# Patient Record
Sex: FEMALE | Race: WHITE | HISPANIC OR LATINO | Employment: OTHER | URBAN - METROPOLITAN AREA
[De-identification: names, ages, dates, MRNs, and addresses within clinical notes are randomized per-mention and may not be internally consistent; named-entity substitution may affect disease eponyms.]

---

## 2017-02-13 ENCOUNTER — LAB (OUTPATIENT)
Dept: LAB | Facility: HOSPITAL | Age: 67
End: 2017-02-13

## 2017-02-13 ENCOUNTER — OFFICE VISIT (OUTPATIENT)
Dept: ONCOLOGY | Facility: CLINIC | Age: 67
End: 2017-02-13

## 2017-02-13 VITALS
WEIGHT: 108 LBS | DIASTOLIC BLOOD PRESSURE: 84 MMHG | TEMPERATURE: 98.3 F | HEART RATE: 70 BPM | BODY MASS INDEX: 17.99 KG/M2 | OXYGEN SATURATION: 98 % | RESPIRATION RATE: 14 BRPM | SYSTOLIC BLOOD PRESSURE: 134 MMHG | HEIGHT: 65 IN

## 2017-02-13 DIAGNOSIS — C91.10 CHRONIC LYMPHOID LEUKEMIA (HCC): Primary | ICD-10-CM

## 2017-02-13 DIAGNOSIS — C91.10 CHRONIC LYMPHOID LEUKEMIA (HCC): ICD-10-CM

## 2017-02-13 LAB
ALBUMIN SERPL-MCNC: 4.3 G/DL (ref 3.5–5.2)
ALBUMIN/GLOB SERPL: 1.6 G/DL (ref 1.1–2.4)
ALP SERPL-CCNC: 81 U/L (ref 38–116)
ALT SERPL W P-5'-P-CCNC: 24 U/L (ref 0–33)
ANION GAP SERPL CALCULATED.3IONS-SCNC: 13.7 MMOL/L
AST SERPL-CCNC: 29 U/L (ref 0–32)
BASOPHILS # BLD AUTO: 0.05 10*3/MM3 (ref 0–0.1)
BASOPHILS NFR BLD AUTO: 0.6 % (ref 0–1.1)
BILIRUB SERPL-MCNC: 0.3 MG/DL (ref 0.1–1.2)
BUN BLD-MCNC: 14 MG/DL (ref 6–20)
BUN/CREAT SERPL: 21.9 (ref 7.3–30)
CALCIUM SPEC-SCNC: 9.6 MG/DL (ref 8.5–10.2)
CHLORIDE SERPL-SCNC: 103 MMOL/L (ref 98–107)
CO2 SERPL-SCNC: 29.3 MMOL/L (ref 22–29)
CREAT BLD-MCNC: 0.64 MG/DL (ref 0.6–1.1)
DEPRECATED RDW RBC AUTO: 44.3 FL (ref 37–49)
EOSINOPHIL # BLD AUTO: 0.27 10*3/MM3 (ref 0–0.36)
EOSINOPHIL NFR BLD AUTO: 3.3 % (ref 1–5)
ERYTHROCYTE [DISTWIDTH] IN BLOOD BY AUTOMATED COUNT: 13.1 % (ref 11.7–14.5)
GFR SERPL CREATININE-BSD FRML MDRD: 93 ML/MIN/1.73
GLOBULIN UR ELPH-MCNC: 2.7 GM/DL (ref 1.8–3.5)
GLUCOSE BLD-MCNC: 79 MG/DL (ref 74–124)
HCT VFR BLD AUTO: 39.8 % (ref 34–45)
HGB BLD-MCNC: 13.4 G/DL (ref 11.5–14.9)
IMM GRANULOCYTES # BLD: 0.02 10*3/MM3 (ref 0–0.03)
IMM GRANULOCYTES NFR BLD: 0.2 % (ref 0–0.5)
LDH SERPL-CCNC: 220 U/L (ref 99–259)
LYMPHOCYTES # BLD AUTO: 2.43 10*3/MM3 (ref 1–3.5)
LYMPHOCYTES NFR BLD AUTO: 29.3 % (ref 20–49)
MCH RBC QN AUTO: 30.9 PG (ref 27–33)
MCHC RBC AUTO-ENTMCNC: 33.7 G/DL (ref 32–35)
MCV RBC AUTO: 91.7 FL (ref 83–97)
MONOCYTES # BLD AUTO: 0.52 10*3/MM3 (ref 0.25–0.8)
MONOCYTES NFR BLD AUTO: 6.3 % (ref 4–12)
NEUTROPHILS # BLD AUTO: 4.99 10*3/MM3 (ref 1.5–7)
NEUTROPHILS NFR BLD AUTO: 60.3 % (ref 39–75)
NRBC BLD MANUAL-RTO: 0 /100 WBC (ref 0–0)
PLATELET # BLD AUTO: 224 10*3/MM3 (ref 150–375)
PMV BLD AUTO: 10.2 FL (ref 8.9–12.1)
POTASSIUM BLD-SCNC: 4 MMOL/L (ref 3.5–4.7)
PROT SERPL-MCNC: 7 G/DL (ref 6.3–8)
RBC # BLD AUTO: 4.34 10*6/MM3 (ref 3.9–5)
SODIUM BLD-SCNC: 146 MMOL/L (ref 134–145)
WBC NRBC COR # BLD: 8.28 10*3/MM3 (ref 4–10)

## 2017-02-13 PROCEDURE — 83615 LACTATE (LD) (LDH) ENZYME: CPT | Performed by: INTERNAL MEDICINE

## 2017-02-13 PROCEDURE — 36415 COLL VENOUS BLD VENIPUNCTURE: CPT | Performed by: INTERNAL MEDICINE

## 2017-02-13 PROCEDURE — 99213 OFFICE O/P EST LOW 20 MIN: CPT | Performed by: INTERNAL MEDICINE

## 2017-02-13 PROCEDURE — 85025 COMPLETE CBC W/AUTO DIFF WBC: CPT | Performed by: INTERNAL MEDICINE

## 2017-02-13 PROCEDURE — 80053 COMPREHEN METABOLIC PANEL: CPT | Performed by: INTERNAL MEDICINE

## 2017-02-13 NOTE — PROGRESS NOTES
Subjective  :  Chronic lymphoid leukemia     The patient has completed 6 cycles of Treanda/Rituxan and on 05/03/2016 we advised the patient to proceed with further assessment including bone marrow aspirate and biopsy on the left pelvic bone. The patient also had a CT scan of the chest, abdomen and pelvis that showed resolution of all her areas of disease, especially retroperitoneal adenopathy. Her white count has normalized, white count differential is normal, hemoglobin is normal, platelet count is normal. She has no palpable adenopathies, no B symptoms, no infections and no autoimmunity. The patient will remain in observation returning to see us in a month to discuss analysis of her bone marrow testing. Plan to continue her prophylactic antibiotic, antifungal and antiviral for at least another 4-5 months. Plan to repeat her LDH that has been minimally elevated during previous visits on the next visit.  During the previous visit the patient requested 3rd opinion at [facility]. . The patient states that her physician told her that she looks like she had achieved a very good remission in regard to her CLL and she was advised at the time of relapse to go into a clinical trial.              History of Present Illness   So far the patient is not having any fevers, chills, adenopathy, rashes in the skin or fatigue. She has not developed any pain related to malignancy. Her weight and appetite remain normal. Her bowel activity and urination remain normal. She has no cardiorespiratory symptomatology at this time. She remains fully functional, ECOG performance status 0.    Past Medical History, Past Surgical History:.  :  Scoliosis; this gives her back pain.      No history of hypertension, coronary artery disease, cardiac arrhythmia, chronic obstructive pulmonary disease, asthma, emphysema, pleurisy, or gastrointestinal disorders of any nature.  She has no history of peptic ulcer disease.  The patient has no  history of hepatitis, pancreatitis, or colitis.  She has never had a colonoscopy.      She has no history of neurological disorder, no history of psychiatric illnesses.     SURGICAL HISTORY:    1. Replacement of the left shoulder in  in [place], [place] because of osteoarthritis, trauma, and pain.    2. Left ovary removed and part of the right ovary because of cyst formations; this was done in  in [place], [place].  No malignancy was documented.    3. Left shoulder revision in .   4. Nasal septum correction years ago.     GYN HISTORY:  Her pelvic exams have been normal.  Mammograms and self-breast examinations have been normal.  She has never had abnormal Pap smears.  She is a  0, para 0.      Review of Systems   General: no fever, chills, fatigue, weight changes, or lack of appetite.  Eyes: no epiphora, xerophthalmia,conjunctivitis, pain, glaucoma, blurred vision, blindness, secretion, photophobia, proptosis, diplopia.  Ears: no otorrhea, tinnitus, otorrhagia, deafness, pain, vertigo.  Nose: no rhinorrhea, epistaxis, alteration in perception of odors, sinuses pressure.  Mouth: no alteration in gums or teeth,  ulcers, no difficulty with mastication or deglut ion, no odynophagia.  Neck: no masses or pain, no thyroid alterations, no pain in muscles or arteries, no carotid odynia, no crepitation.  Respiratory: no cough, sputum production, dyspnea, trepopnea, pleuritic pain, hemoptysis.  Heart: no syncope, irregularity, palpitations, angina, orthopnea, paroxysmal nocturnal dyspnea.  Vascular Venous: no tenderness,edema, palpable cords, postphlebitic syndrome, skin changes or ulcerations.  Vascular Arterial: no distal ischemia, claudication, gangrene, neuropathic ischemic pain, skin ulcers, paleness or cyanosis.  GI: no dysphagia, odynophagia, no regurgitation, no heartburn,no indigestion,no nausea,no vomiting,no hematemesis ,no melena,no jaundice,no distention, no obstipation,no enterorrhagia,no  "proctalgia,no anal  lesions, nochanges in bowel habits.  : no frequency, hesitancy, hematuria, discharge, pain.  Musculoskeletal: no muscle or tendon pain or inflammation, joint pain, edema, functional limitation, fasciculations, mass.  Neurologic: no headache, seizures, alterations on Craneal nerves, no motor or senssory deficit, normal coordination, no alteration in memory, orientation, calculation,writting, verbal or written language.  Skin: no rashes, pruritus or localized lesions.  Psychiatric: no anxiety, depression, agitation, delusions, proper insight.  Medications:  The current medication list was reviewed in the EMR    ALLERGIES:    Allergies   Allergen Reactions   • Asendin [Amoxapine]    • Diphenhydramine Hcl Other (See Comments)     Redness,  Pt prefers not to take       Objective      Vitals:    02/13/17 0906   BP: 134/84   Pulse: 70   Resp: 14   Temp: 98.3 °F (36.8 °C)   TempSrc: Oral   SpO2: 98%   Weight: 108 lb (49 kg)   Height: 64.57\" (164 cm)   PainSc: 0-No pain     Current Status 2/13/2017   ECOG score 0   no pain    Physical Exam    GENERAL:  Well-developed, well-nourished  Patient  in no acute distress.   SKIN:  Warm, dry without rashes, purpura or petechiae.  HEENT:  Pupils were equal and reactive to light and accomodation, conjunctivas non injected, no pterigion, normal extraocular movements, normal visual acuity.   Mouth mucosa was moist, no exudates in oropharynx, normal gum line, normal roof of the mouth and pillars, normal papillations of the tongue.Ear canals were normal, as well tympanic membranes, normal hearing acuity.No pain in mastoid area or erythema.  NECK:  Supple with good range of motion; no thyromegaly or masses, no JVD or bruits, no cervical adenopathies.No carotid arteries pain, no carotid abnormal pulsation or arterial dance.  LYMPHATICS:  No cervical, supraclavicular, axillary, epitrochlear or inguinal adenopathy.  CHEST:  Normal excursion of both qian thoraces, normal " voice fremitus, no subcutaneous emphysema, normal axillas, no rashes or acanthosis nigricans. Lungs clear to percussion and auscultation, normal breath sounds bilaterally, no wheezing, crackles or ronchi, no stridor, no rubs.  CARDIAC AND VASCULAR: PMI not displaced,no thrills, normal rate and regular rhythm, without murmurs, rubs or S3 or S4 right or left sided gallops. Normal femoral, popliteal, pedis, brachial and carotid pulses.  ABDOMEN:  Soft, nontender with no organomegaly or masses, no ascites, no collateral circulation,no distention,no Sd sign, no abdominal pain, no inguinal hernias,no umbilical hernias, no abdominal bruits. Normal bowel sounds.  GENITAL: Not  Performed.  EXTREMITIES  AND SPINE:  No clubbing, cyanosis or edema, no deformities or pain .no changes in her  kyphosis, andscoliosis,or pain in spine.  NEUROLOGICAL:  Patient was awake, alert, oriented to time, person and place,normal gait and coordination, negative Romberg.  GRECENT LABS:  Hematology WBC   Date Value Ref Range Status   02/13/2017 8.28 4.00 - 10.00 10*3/mm3 Final     RBC   Date Value Ref Range Status   02/13/2017 4.34 3.90 - 5.00 10*6/mm3 Final     HEMOGLOBIN   Date Value Ref Range Status   02/13/2017 13.4 11.5 - 14.9 g/dL Final     HEMATOCRIT   Date Value Ref Range Status   02/13/2017 39.8 34.0 - 45.0 % Final     PLATELETS   Date Value Ref Range Status   02/13/2017 224 150 - 375 10*3/mm3 Final      Differential   Order: [id number] - Part of Panel Order [id number]   Status:  Final result   Visible to patient:  No (Not Released) Dx:  Chronic lymphoid leukemia      Ref Range & Units 8:48 AM     WBC 4.00 - 10.00 10*3/mm3 8.28   RBC 3.90 - 5.00 10*6/mm3 4.34   Hemoglobin 11.5 - 14.9 g/dL 13.4   Hematocrit 34.0 - 45.0 % 39.8   MCV 83.0 - 97.0 fL 91.7   MCH 27.0 - 33.0 pg 30.9   MCHC 32.0 - 35.0 g/dL 33.7   RDW 11.7 - 14.5 % 13.1   RDW-SD 37.0 - 49.0 fl 44.3   MPV 8.9 - 12.1 fL 10.2   Platelets 150 - 375 10*3/mm3 224   Neutrophil % 39.0 -  75.0 % 60.3   Lymphocyte % 20.0 - 49.0 % 29.3   Monocyte % 4.0 - 12.0 % 6.3   Eosinophil % 1.0 - 5.0 % 3.3   Basophil % 0.0 - 1.1 % 0.6   Immature Grans % 0.0 - 0.5 % 0.2   Neutrophils, Absolute 1.50 - 7.00 10*3/mm3 4.99   Lymphocytes, Absolute 1.00 - 3.50 10*3/mm3 2.43   Monocytes, Absolute 0.25 - 0.80 10*3/mm3 0.52   Eosinophils, Absolute 0.00 - 0.36 10*3/mm3 0.27   Basophils, Absolute 0.00 - 0.10 10*3/mm3 0.05   Immature Grans, Absolute 0.00 - 0.03 10*3/mm3 0.02   nRBC 0.0 - 0.0 /100 WBC 0.0   Resulting Agency   [facility]      Specimen Collected: 02/13/17  8:48 AM Last Resulted: 02/13/17  9:04 AM                  Assessment/Plan     Chronic lymphoid leukemia     This patient has completed 6 cycles of Treanda/Rituxan with excellent tolerance. She has not developed any infections or autoimmunity. Her physical examination today is normal with resolution of her palpable cervical and axillary adenopathies. She has no B symptoms. The rest of her examination remains unchanged. Her recent laboratory assessment including blood count today shows normalization of her lymphocyte count, stable hemoglobin and stable normal platelet count     I encouraged her to stop her acyclovir for prevention of viral infections .    Otherwise we will review her back in 2 months with a CBC, chemistry profile and LDH.    No need for radiologic testing under the present clinical circumstances, neither to go to [facility].              2/13/2017      CC:

## 2017-06-01 ENCOUNTER — LAB (OUTPATIENT)
Dept: LAB | Facility: HOSPITAL | Age: 67
End: 2017-06-01

## 2017-06-01 ENCOUNTER — TELEPHONE (OUTPATIENT)
Dept: ONCOLOGY | Facility: CLINIC | Age: 67
End: 2017-06-01

## 2017-06-01 ENCOUNTER — OFFICE VISIT (OUTPATIENT)
Dept: ONCOLOGY | Facility: CLINIC | Age: 67
End: 2017-06-01

## 2017-06-01 VITALS
SYSTOLIC BLOOD PRESSURE: 120 MMHG | TEMPERATURE: 98.1 F | RESPIRATION RATE: 14 BRPM | BODY MASS INDEX: 17.66 KG/M2 | HEIGHT: 65 IN | HEART RATE: 68 BPM | WEIGHT: 106 LBS | DIASTOLIC BLOOD PRESSURE: 70 MMHG

## 2017-06-01 DIAGNOSIS — C91.10 CHRONIC LYMPHOID LEUKEMIA (HCC): Primary | ICD-10-CM

## 2017-06-01 LAB
ALBUMIN SERPL-MCNC: 4.2 G/DL (ref 3.5–5.2)
ALBUMIN/GLOB SERPL: 1.4 G/DL (ref 1.1–2.4)
ALP SERPL-CCNC: 80 U/L (ref 38–116)
ALT SERPL W P-5'-P-CCNC: 20 U/L (ref 0–33)
ANION GAP SERPL CALCULATED.3IONS-SCNC: 14.5 MMOL/L
AST SERPL-CCNC: 28 U/L (ref 0–32)
BASOPHILS # BLD AUTO: 0.07 10*3/MM3 (ref 0–0.1)
BASOPHILS NFR BLD AUTO: 0.8 % (ref 0–1.1)
BILIRUB SERPL-MCNC: 0.3 MG/DL (ref 0.1–1.2)
BUN BLD-MCNC: 11 MG/DL (ref 6–20)
BUN/CREAT SERPL: 18.6 (ref 7.3–30)
CALCIUM SPEC-SCNC: 9 MG/DL (ref 8.5–10.2)
CHLORIDE SERPL-SCNC: 101 MMOL/L (ref 98–107)
CO2 SERPL-SCNC: 26.5 MMOL/L (ref 22–29)
CREAT BLD-MCNC: 0.59 MG/DL (ref 0.6–1.1)
DEPRECATED RDW RBC AUTO: 46.1 FL (ref 37–49)
EOSINOPHIL # BLD AUTO: 0.36 10*3/MM3 (ref 0–0.36)
EOSINOPHIL NFR BLD AUTO: 4 % (ref 1–5)
ERYTHROCYTE [DISTWIDTH] IN BLOOD BY AUTOMATED COUNT: 13.7 % (ref 11.7–14.5)
GFR SERPL CREATININE-BSD FRML MDRD: 102 ML/MIN/1.73
GLOBULIN UR ELPH-MCNC: 3.1 GM/DL (ref 1.8–3.5)
GLUCOSE BLD-MCNC: 72 MG/DL (ref 74–124)
HCT VFR BLD AUTO: 42.6 % (ref 34–45)
HGB BLD-MCNC: 13.9 G/DL (ref 11.5–14.9)
IMM GRANULOCYTES # BLD: 0.03 10*3/MM3 (ref 0–0.03)
IMM GRANULOCYTES NFR BLD: 0.3 % (ref 0–0.5)
LDH SERPL-CCNC: 221 U/L (ref 99–259)
LYMPHOCYTES # BLD AUTO: 3.36 10*3/MM3 (ref 1–3.5)
LYMPHOCYTES NFR BLD AUTO: 37.5 % (ref 20–49)
MCH RBC QN AUTO: 30.4 PG (ref 27–33)
MCHC RBC AUTO-ENTMCNC: 32.6 G/DL (ref 32–35)
MCV RBC AUTO: 93.2 FL (ref 83–97)
MONOCYTES # BLD AUTO: 1.02 10*3/MM3 (ref 0.25–0.8)
MONOCYTES NFR BLD AUTO: 11.4 % (ref 4–12)
NEUTROPHILS # BLD AUTO: 4.13 10*3/MM3 (ref 1.5–7)
NEUTROPHILS NFR BLD AUTO: 46 % (ref 39–75)
NRBC BLD MANUAL-RTO: 0 /100 WBC (ref 0–0)
PLATELET # BLD AUTO: 248 10*3/MM3 (ref 150–375)
PMV BLD AUTO: 9.9 FL (ref 8.9–12.1)
POTASSIUM BLD-SCNC: 3.5 MMOL/L (ref 3.5–4.7)
PROT SERPL-MCNC: 7.3 G/DL (ref 6.3–8)
RBC # BLD AUTO: 4.57 10*6/MM3 (ref 3.9–5)
SODIUM BLD-SCNC: 142 MMOL/L (ref 134–145)
WBC NRBC COR # BLD: 8.97 10*3/MM3 (ref 4–10)

## 2017-06-01 PROCEDURE — 36415 COLL VENOUS BLD VENIPUNCTURE: CPT

## 2017-06-01 PROCEDURE — 99213 OFFICE O/P EST LOW 20 MIN: CPT | Performed by: INTERNAL MEDICINE

## 2017-06-01 PROCEDURE — 80053 COMPREHEN METABOLIC PANEL: CPT

## 2017-06-01 PROCEDURE — 83615 LACTATE (LD) (LDH) ENZYME: CPT

## 2017-06-01 PROCEDURE — 85025 COMPLETE CBC W/AUTO DIFF WBC: CPT

## 2017-06-01 NOTE — PROGRESS NOTES
Subjective  :  Chronic lymphoid leukemia     The patient has completed 6 cycles of Treanda/Rituxan and on 05/03/2016 we advised the patient to proceed with further assessment including bone marrow aspirate and biopsy on the left pelvic bone. The patient also had a CT scan of the chest, abdomen and pelvis that showed resolution of all her areas of disease, especially retroperitoneal adenopathy. Her white count has normalized, white count differential is normal, hemoglobin is normal, platelet count is normal. She has no palpable adenopathies, no B symptoms, no infections and no autoimmunity. The patient will remain in observation returning to see us in a month to discuss analysis of her bone marrow testing.  During the previous visit the patient requested 3rd opinion at [facility]. . The patient states that her physician told her that she looks like she had achieved a very good remission in regard to her CLL and she was advised at the time of relapse to go into a clinical trial.              History of Present Illness   So far the patient is not having any fevers, chills, adenopathy, rashes in the skin or fatigue. She has not developed any pain related to malignancy. Her weight and appetite remain normal. Her bowel activity and urination remain normal. She has no cardiorespiratory symptomatology at this time. She remains fully functional, ECOG performance status 0.    Past Medical History, Past Surgical History:.  :  Scoliosis; this gives her back pain.      No history of hypertension, coronary artery disease, cardiac arrhythmia, chronic obstructive pulmonary disease, asthma, emphysema, pleurisy, or gastrointestinal disorders of any nature.  She has no history of peptic ulcer disease.  The patient has no history of hepatitis, pancreatitis, or colitis.  She has never had a colonoscopy.      She has no history of neurological disorder, no history of psychiatric illnesses.     SURGICAL HISTORY:     1. Replacement of the left shoulder in  in [place], [place] because of osteoarthritis, trauma, and pain.    2. Left ovary removed and part of the right ovary because of cyst formations; this was done in  in [place], [place].  No malignancy was documented.    3. Left shoulder revision in .   4. Nasal septum correction years ago.     GYN HISTORY:  Her pelvic exams have been normal.  Mammograms and self-breast examinations have been normal.  She has never had abnormal Pap smears.  She is a  0, para 0.      Review of Systems   General: no fever, chills, fatigue, weight changes, or lack of appetite.  Eyes: no epiphora, xerophthalmia,conjunctivitis, pain, glaucoma, blurred vision, blindness, secretion, photophobia, proptosis, diplopia.  Ears: no otorrhea, tinnitus, otorrhagia, deafness, pain, vertigo.  Nose: no rhinorrhea, epistaxis, alteration in perception of odors, sinuses pressure.  Mouth: no alteration in gums or teeth,  ulcers, no difficulty with mastication or deglut ion, no odynophagia.  Neck: no masses or pain, no thyroid alterations, no pain in muscles or arteries, no carotid odynia, no crepitation.  Respiratory: no cough, sputum production, dyspnea, trepopnea, pleuritic pain, hemoptysis.  Heart: no syncope, irregularity, palpitations, angina, orthopnea, paroxysmal nocturnal dyspnea.  Vascular Venous: no tenderness,edema, palpable cords, postphlebitic syndrome, skin changes or ulcerations.  Vascular Arterial: no distal ischemia, claudication, gangrene, neuropathic ischemic pain, skin ulcers, paleness or cyanosis.  GI: no dysphagia, odynophagia, no regurgitation, no heartburn,no indigestion,no nausea,no vomiting,no hematemesis ,no melena,no jaundice,no distention, no obstipation,no enterorrhagia,no proctalgia,no anal  lesions, nochanges in bowel habits.  : no frequency, hesitancy, hematuria, discharge, pain.  Musculoskeletal: no muscle or tendon pain or inflammation, joint pain,  "edema, functional limitation, fasciculations, mass.  Neurologic: no headache, seizures, alterations on Craneal nerves, no motor or senssory deficit, normal coordination, no alteration in memory, orientation, calculation,writting, verbal or written language.  Skin: no rashes, pruritus or localized lesions.  Psychiatric: no anxiety, depression, agitation, delusions, proper insight.  Medications:  The current medication list was reviewed in the EMR    ALLERGIES:    Allergies   Allergen Reactions   • Asendin [Amoxapine]    • Diphenhydramine Hcl Other (See Comments)     Redness,  Pt prefers not to take       Objective      Vitals:    06/01/17 0949   Weight: 106 lb (48.1 kg)   Height: 64.57\" (164 cm)   PainSc: 0-No pain     Current Status 6/1/2017   ECOG score 0   no pain    Physical Exam    GENERAL:  Well-developed, well-nourished  Patient  in no acute distress.   SKIN:  Warm, dry without rashes, purpura or petechiae.  HEENT:  Pupils were equal and reactive to light and accomodation, conjunctivas non injected, no pterigion, normal extraocular movements, normal visual acuity.   Mouth mucosa was moist, no exudates in oropharynx, normal gum line, normal roof of the mouth and pillars, normal papillations of the tongue.Ear canals were normal, as well tympanic membranes, normal hearing acuity.No pain in mastoid area or erythema.  NECK:  Supple with good range of motion; no thyromegaly or masses, no JVD or bruits, no cervical adenopathies.No carotid arteries pain, no carotid abnormal pulsation or arterial dance.  LYMPHATICS:  No cervical, supraclavicular, axillary, epitrochlear or inguinal adenopathy.  CHEST:  Normal excursion of both qian thoraces, normal voice fremitus, no subcutaneous emphysema, normal axillas, no rashes or acanthosis nigricans. Lungs clear to percussion and auscultation, normal breath sounds bilaterally, no wheezing, crackles or ronchi, no stridor, no rubs.  CARDIAC AND VASCULAR: PMI not displaced,no " thrills, normal rate and regular rhythm, without murmurs, rubs or S3 or S4 right or left sided gallops. Normal femoral, popliteal, pedis, brachial and carotid pulses.  ABDOMEN:  Soft, nontender with no organomegaly or masses, no ascites, no collateral circulation,no distention,no Sd sign, no abdominal pain, no inguinal hernias,no umbilical hernias, no abdominal bruits. Normal bowel sounds.  GENITAL: Not  Performed.  EXTREMITIES  AND SPINE:  No clubbing, cyanosis or edema, no deformities or pain .no changes in her  kyphosis, andscoliosis,or pain in spine.  NEUROLOGICAL:  Patient was awake, alert, oriented to time, person and place,normal gait and coordination, negative Romberg.  GRECENT LABS:  Hematology WBC   Date Value Ref Range Status   06/01/2017 8.97 4.00 - 10.00 10*3/mm3 Final     RBC   Date Value Ref Range Status   06/01/2017 4.57 3.90 - 5.00 10*6/mm3 Final     Hemoglobin   Date Value Ref Range Status   06/01/2017 13.9 11.5 - 14.9 g/dL Final     Hematocrit   Date Value Ref Range Status   06/01/2017 42.6 34.0 - 45.0 % Final     Platelets   Date Value Ref Range Status   06/01/2017 248 150 - 375 10*3/mm3 Final        Component      Latest Ref Rng & Units 2/13/2017 6/1/2017   WBC      4.00 - 10.00 10*3/mm3 8.28 8.97   RBC      3.90 - 5.00 10*6/mm3 4.34 4.57   Hemoglobin      11.5 - 14.9 g/dL 13.4 13.9   Hematocrit      34.0 - 45.0 % 39.8 42.6   MCV      83.0 - 97.0 fL 91.7 93.2   MCH      27.0 - 33.0 pg 30.9 30.4   MCHC      32.0 - 35.0 g/dL 33.7 32.6   RDW      11.7 - 14.5 % 13.1 13.7   RDW-SD      37.0 - 49.0 fl 44.3 46.1   MPV      8.9 - 12.1 fL 10.2 9.9   Platelets      150 - 375 10*3/mm3 224 248   Neutrophil %      39.0 - 75.0 % 60.3 46.0   Lymphocyte %      20.0 - 49.0 % 29.3 37.5   Monocyte %      4.0 - 12.0 % 6.3 11.4   Eosinophil %      1.0 - 5.0 % 3.3 4.0   Basophil %      0.0 - 1.1 % 0.6 0.8   Immature Grans %      0.0 - 0.5 % 0.2 0.3   Neutrophils, Absolute      1.50 - 7.00 10*3/mm3 4.99 4.13    Lymphocytes, Absolute      1.00 - 3.50 10*3/mm3 2.43 3.36   Monocytes, Absolute      0.25 - 0.80 10*3/mm3 0.52 1.02 (H)   Eosinophils, Absolute      0.00 - 0.36 10*3/mm3 0.27 0.36   Basophils, Absolute      0.00 - 0.10 10*3/mm3 0.05 0.07   Immature Grans, Absolute      0.00 - 0.03 10*3/mm3 0.02 0.03   nRBC      0.0 - 0.0 /100 WBC 0.0 0.0           Assessment/Plan     Chronic lymphoid leukemia     This patient has completed 6 cycles of Treanda/Rituxan with excellent tolerance. She has not developed any infections or autoimmunity. Her physical examination today is normal with resolution of her palpable cervical and axillary adenopathies. She has no B symptoms. The rest of her examination remains unchanged.    Today we discussed with the patient and  a minor rise in the lymphocytes with a total lymphocyte count of 3300 still within normal limits but telling us that eventually her leukemia is going to come back. She already has an appointment to be seen at [facility] next week. She wants to participate in a clinical trial at that institution and that will be perfectly fine with me. I have made a tentative appointment for her to return back and see us in a couple of months and I asked her to give us a call after she is seen by the physicians at [facility].    She will be called at home with the report of her LDH and the rest of the chemistry profile not available yet.    She agrees to proceed with this.                 6/1/2017      CC:

## 2017-06-07 ENCOUNTER — TELEPHONE (OUTPATIENT)
Dept: ONCOLOGY | Facility: CLINIC | Age: 67
End: 2017-06-07

## 2017-06-07 NOTE — TELEPHONE ENCOUNTER
Patient called to let Dr Minaya know she is back from [facility]. She said he wanted to talk to her about her appt with them. Call given to Dr Minaya.

## 2017-06-07 NOTE — TELEPHONE ENCOUNTER
Pt seen at [facility] center, recommended observation and monitoring by us, few prolymphocytes in peripheral blood mild elevation ldh, she aggred with that, keep cb here the same

## 2017-07-31 ENCOUNTER — OFFICE VISIT (OUTPATIENT)
Dept: ONCOLOGY | Facility: CLINIC | Age: 67
End: 2017-07-31

## 2017-07-31 ENCOUNTER — LAB (OUTPATIENT)
Dept: LAB | Facility: HOSPITAL | Age: 67
End: 2017-07-31

## 2017-07-31 VITALS
DIASTOLIC BLOOD PRESSURE: 70 MMHG | RESPIRATION RATE: 14 BRPM | BODY MASS INDEX: 17.49 KG/M2 | WEIGHT: 105 LBS | OXYGEN SATURATION: 100 % | HEIGHT: 65 IN | TEMPERATURE: 97.7 F | HEART RATE: 58 BPM | SYSTOLIC BLOOD PRESSURE: 122 MMHG

## 2017-07-31 DIAGNOSIS — C91.10 CHRONIC LYMPHOID LEUKEMIA (HCC): Primary | ICD-10-CM

## 2017-07-31 DIAGNOSIS — C91.10 CHRONIC LYMPHOID LEUKEMIA (HCC): ICD-10-CM

## 2017-07-31 LAB
ALBUMIN SERPL-MCNC: 4 G/DL (ref 3.5–5.2)
ALBUMIN/GLOB SERPL: 1.3 G/DL (ref 1.1–2.4)
ALP SERPL-CCNC: 74 U/L (ref 38–116)
ALT SERPL W P-5'-P-CCNC: 21 U/L (ref 0–33)
ANION GAP SERPL CALCULATED.3IONS-SCNC: 13.9 MMOL/L
AST SERPL-CCNC: 29 U/L (ref 0–32)
B2 MICROGLOB SERPL-MCNC: 2.5 MG/L (ref 0.8–2.2)
BASOPHILS # BLD AUTO: 0.09 10*3/MM3 (ref 0–0.1)
BASOPHILS NFR BLD AUTO: 0.8 % (ref 0–1.1)
BILIRUB SERPL-MCNC: 0.3 MG/DL (ref 0.1–1.2)
BUN BLD-MCNC: 18 MG/DL (ref 6–20)
BUN/CREAT SERPL: 28.6 (ref 7.3–30)
CALCIUM SPEC-SCNC: 9 MG/DL (ref 8.5–10.2)
CHLORIDE SERPL-SCNC: 102 MMOL/L (ref 98–107)
CO2 SERPL-SCNC: 25.1 MMOL/L (ref 22–29)
CREAT BLD-MCNC: 0.63 MG/DL (ref 0.6–1.1)
DEPRECATED RDW RBC AUTO: 45.6 FL (ref 37–49)
EOSINOPHIL # BLD AUTO: 0.3 10*3/MM3 (ref 0–0.36)
EOSINOPHIL NFR BLD AUTO: 2.6 % (ref 1–5)
ERYTHROCYTE [DISTWIDTH] IN BLOOD BY AUTOMATED COUNT: 13.2 % (ref 11.7–14.5)
GFR SERPL CREATININE-BSD FRML MDRD: 94 ML/MIN/1.73
GLOBULIN UR ELPH-MCNC: 3.2 GM/DL (ref 1.8–3.5)
GLUCOSE BLD-MCNC: 77 MG/DL (ref 74–124)
HCT VFR BLD AUTO: 42.7 % (ref 34–45)
HGB BLD-MCNC: 14.2 G/DL (ref 11.5–14.9)
HOLD SPECIMEN: NORMAL
IMM GRANULOCYTES # BLD: 0.03 10*3/MM3 (ref 0–0.03)
IMM GRANULOCYTES NFR BLD: 0.3 % (ref 0–0.5)
LDH SERPL-CCNC: 250 U/L (ref 99–259)
LYMPHOCYTES # BLD AUTO: 6.06 10*3/MM3 (ref 1–3.5)
LYMPHOCYTES NFR BLD AUTO: 53.2 % (ref 20–49)
MCH RBC QN AUTO: 31.1 PG (ref 27–33)
MCHC RBC AUTO-ENTMCNC: 33.3 G/DL (ref 32–35)
MCV RBC AUTO: 93.6 FL (ref 83–97)
MONOCYTES # BLD AUTO: 0.67 10*3/MM3 (ref 0.25–0.8)
MONOCYTES NFR BLD AUTO: 5.9 % (ref 4–12)
NEUTROPHILS # BLD AUTO: 4.24 10*3/MM3 (ref 1.5–7)
NEUTROPHILS NFR BLD AUTO: 37.2 % (ref 39–75)
NRBC BLD MANUAL-RTO: 0 /100 WBC (ref 0–0)
PLATELET # BLD AUTO: 187 10*3/MM3 (ref 150–375)
PMV BLD AUTO: 10.8 FL (ref 8.9–12.1)
POTASSIUM BLD-SCNC: 3.6 MMOL/L (ref 3.5–4.7)
PROT SERPL-MCNC: 7.2 G/DL (ref 6.3–8)
RBC # BLD AUTO: 4.56 10*6/MM3 (ref 3.9–5)
SODIUM BLD-SCNC: 141 MMOL/L (ref 134–145)
WBC NRBC COR # BLD: 11.39 10*3/MM3 (ref 4–10)

## 2017-07-31 PROCEDURE — 83615 LACTATE (LD) (LDH) ENZYME: CPT | Performed by: INTERNAL MEDICINE

## 2017-07-31 PROCEDURE — 88275 CYTOGENETICS 100-300: CPT | Performed by: INTERNAL MEDICINE

## 2017-07-31 PROCEDURE — 82232 ASSAY OF BETA-2 PROTEIN: CPT | Performed by: INTERNAL MEDICINE

## 2017-07-31 PROCEDURE — 80053 COMPREHEN METABOLIC PANEL: CPT | Performed by: INTERNAL MEDICINE

## 2017-07-31 PROCEDURE — 36415 COLL VENOUS BLD VENIPUNCTURE: CPT | Performed by: INTERNAL MEDICINE

## 2017-07-31 PROCEDURE — 88271 CYTOGENETICS DNA PROBE: CPT | Performed by: INTERNAL MEDICINE

## 2017-07-31 PROCEDURE — 99214 OFFICE O/P EST MOD 30 MIN: CPT | Performed by: INTERNAL MEDICINE

## 2017-07-31 PROCEDURE — 85025 COMPLETE CBC W/AUTO DIFF WBC: CPT | Performed by: INTERNAL MEDICINE

## 2017-07-31 RX ORDER — DILTIAZEM HYDROCHLORIDE 180 MG/1
CAPSULE, EXTENDED RELEASE ORAL
COMMUNITY
Start: 2017-07-13 | End: 2018-05-31 | Stop reason: ALTCHOICE

## 2017-07-31 NOTE — PROGRESS NOTES
Subjective  :  Chronic lymphoid leukemia     The patient has completed 6 cycles of Treanda/Rituxan and on 05/03/2016 we advised the patient to proceed with further assessment including bone marrow aspirate and biopsy on the left pelvic bone. The patient also had a CT scan of the chest, abdomen and pelvis that showed resolution of all her areas of disease, especially retroperitoneal adenopathy. Her white count has normalized, white count differential is normal, hemoglobin is normal, platelet count is normal. She has no palpable adenopathies, no B symptoms, no infections and no autoimmunity. The patient will remain in observation returning to see us in a month to discuss analysis of her bone marrow testing.  During the previous visit the patient requested 3rd opinion at [facility]. . The patient states that her physician told her that she looks like she had achieved a very good remission in regard to her CLL and she was advised at the time of relapse to go into a clinical trial.              History of Present Illness   So far the patient is not having any fevers, chills, adenopathy, rashes in the skin or fatigue. She has not developed any pain related to malignancy. Her weight and appetite remain normal. Her bowel activity and urination remain normal. She has no cardiorespiratory symptomatology at this time. She remains fully functional, ECOG performance status 0.    Past Medical History, Past Surgical History:.  :  Scoliosis; this gives her back pain.      No history of hypertension, coronary artery disease, cardiac arrhythmia, chronic obstructive pulmonary disease, asthma, emphysema, pleurisy, or gastrointestinal disorders of any nature.  She has no history of peptic ulcer disease.  The patient has no history of hepatitis, pancreatitis, or colitis.  She has never had a colonoscopy.      She has no history of neurological disorder, no history of psychiatric illnesses.     SURGICAL HISTORY:     1. Replacement of the left shoulder in  in [place], [place] because of osteoarthritis, trauma, and pain.    2. Left ovary removed and part of the right ovary because of cyst formations; this was done in  in [place], [place].  No malignancy was documented.    3. Left shoulder revision in .   4. Nasal septum correction years ago.     GYN HISTORY:  Her pelvic exams have been normal.  Mammograms and self-breast examinations have been normal.  She has never had abnormal Pap smears.  She is a  0, para 0.      Review of Systems   General: no fever, chills, fatigue, weight changes, or lack of appetite.  Eyes: no epiphora, xerophthalmia,conjunctivitis, pain, glaucoma, blurred vision, blindness, secretion, photophobia, proptosis, diplopia.  Ears: no otorrhea, tinnitus, otorrhagia, deafness, pain, vertigo.  Nose: no rhinorrhea, epistaxis, alteration in perception of odors, sinuses pressure.  Mouth: no alteration in gums or teeth,  ulcers, no difficulty with mastication or deglut ion, no odynophagia.  Neck: no masses or pain, no thyroid alterations, no pain in muscles or arteries, no carotid odynia, no crepitation.  Respiratory: no cough, sputum production, dyspnea, trepopnea, pleuritic pain, hemoptysis.  Heart: no syncope, irregularity, palpitations, angina, orthopnea, paroxysmal nocturnal dyspnea.  Vascular Venous: no tenderness,edema, palpable cords, postphlebitic syndrome, skin changes or ulcerations.  Vascular Arterial: no distal ischemia, claudication, gangrene, neuropathic ischemic pain, skin ulcers, paleness or cyanosis.  GI: no dysphagia, odynophagia, no regurgitation, no heartburn,no indigestion,no nausea,no vomiting,no hematemesis ,no melena,no jaundice,no distention, no obstipation,no enterorrhagia,no proctalgia,no anal  lesions, nochanges in bowel habits.  : no frequency, hesitancy, hematuria, discharge, pain.  Musculoskeletal: no muscle or tendon pain or inflammation, joint pain,  "edema, functional limitation, fasciculations, mass.  Neurologic: no headache, seizures, alterations on Craneal nerves, no motor or senssory deficit, normal coordination, no alteration in memory, orientation, calculation,writting, verbal or written language.  Skin: no rashes, pruritus or localized lesions.  Psychiatric: no anxiety, depression, agitation, delusions, proper insight.  Medications:  The current medication list was reviewed in the EMR    ALLERGIES:    Allergies   Allergen Reactions   • Asendin [Amoxapine]    • Diphenhydramine Hcl Other (See Comments)     Redness,  Pt prefers not to take       Objective      Vitals:    07/31/17 0917   BP: 122/70   Pulse: 58   Resp: 14   Temp: 97.7 °F (36.5 °C)   SpO2: 100%  Comment: at rest   Weight: 105 lb (47.6 kg)   Height: 64.57\" (164 cm)   PainSc: 0-No pain     Current Status 7/31/2017   ECOG score 0   no pain    Physical Exam    GENERAL:  Well-developed, well-nourished  Patient  in no acute distress.   SKIN:  Warm, dry without rashes, purpura or petechiae.  HEENT:  Pupils were equal and reactive to light and accomodation, conjunctivas non injected, no pterigion, normal extraocular movements, normal visual acuity.   Mouth mucosa was moist, no exudates in oropharynx, normal gum line, normal roof of the mouth and pillars, normal papillations of the tongue.Ear canals were normal, as well tympanic membranes, normal hearing acuity.No pain in mastoid area or erythema.  NECK:  Supple with good range of motion; no thyromegaly or masses, no JVD or bruits, no cervical adenopathies.No carotid arteries pain, no carotid abnormal pulsation or arterial dance.  LYMPHATICS:  No cervical, supraclavicular, axillary, epitrochlear or inguinal adenopathy.  CHEST:  Normal excursion of both qian thoraces, normal voice fremitus, no subcutaneous emphysema, normal axillas, no rashes or acanthosis nigricans. Lungs clear to percussion and auscultation, normal breath sounds bilaterally, no " wheezing, crackles or ronchi, no stridor, no rubs.  CARDIAC AND VASCULAR: PMI not displaced,no thrills, normal rate and regular rhythm, without murmurs, rubs or S3 or S4 right or left sided gallops. Normal femoral, popliteal, pedis, brachial and carotid pulses.  ABDOMEN:  Soft, nontender with no organomegaly or masses, no ascites, no collateral circulation,no distention,no Sd sign, no abdominal pain, no inguinal hernias,no umbilical hernias, no abdominal bruits. Normal bowel sounds.  GENITAL: Not  Performed.  EXTREMITIES  AND SPINE:  No clubbing, cyanosis or edema, no deformities or pain .no changes in her  kyphosis, andscoliosis,or pain in spine.  NEUROLOGICAL:  Patient was awake, alert, oriented to time, person and place,normal gait and coordination, negative Romberg.  GRECENT LABS:  Hematology WBC   Date Value Ref Range Status   07/31/2017 11.39 (H) 4.00 - 10.00 10*3/mm3 Final     RBC   Date Value Ref Range Status   07/31/2017 4.56 3.90 - 5.00 10*6/mm3 Final     Hemoglobin   Date Value Ref Range Status   07/31/2017 14.2 11.5 - 14.9 g/dL Final     Hematocrit   Date Value Ref Range Status   07/31/2017 42.7 34.0 - 45.0 % Final     Platelets   Date Value Ref Range Status   07/31/2017 187 150 - 375 10*3/mm3 Final        Component      Latest Ref Rng & Units 6/1/2017 7/31/2017   WBC      4.00 - 10.00 10*3/mm3 8.97 11.39 (H)   RBC      3.90 - 5.00 10*6/mm3 4.57 4.56   Hemoglobin      11.5 - 14.9 g/dL 13.9 14.2   Hematocrit      34.0 - 45.0 % 42.6 42.7   MCV      83.0 - 97.0 fL 93.2 93.6   MCH      27.0 - 33.0 pg 30.4 31.1   MCHC      32.0 - 35.0 g/dL 32.6 33.3   RDW      11.7 - 14.5 % 13.7 13.2   RDW-SD      37.0 - 49.0 fl 46.1 45.6   MPV      8.9 - 12.1 fL 9.9 10.8   Platelets      150 - 375 10*3/mm3 248 187   Neutrophil %      39.0 - 75.0 % 46.0 37.2 (L)   Lymphocyte %      20.0 - 49.0 % 37.5 53.2 (H)       Assessment/Plan     Chronic lymphoid leukemia     This patient has completed 6 cycles of Treanda/Rituxan  with excellent tolerance. She has not developed any infections or autoimmunity. Her physical examination today is normal with resolution of her palpable cervical and axillary adenopathies. She has no B symptoms. The rest of her examination remains unchanged.    Today we discussed with the patient and  a FURTHER rise in the lymphocytes with a total lymphocyte count of 6000 telling us that eventually her leukemia is going to come back. She already has an appointment to be seen at [facility] next NOVEMBER. She wants to participate in a clinical trial at that institution and that will be perfectly fine with me. I have made a tentative appointment for her to return back and see us in 3 months  She will be called at home with the report of her LDH and the rest of the chemistry profile not available yet.  We will do a fish analysis in peripheral blood but size flow cytometry as requested.  [facility].  I will proceed with a CT scan of the chest abdomen and pelvis a week before M.D. appointment in 3 months.  We'll proceed with a beta 2 microglobulin.    She agrees to proceed with this.                 7/31/2017      CC:

## 2017-08-01 LAB — REF LAB TEST METHOD: NORMAL

## 2017-08-04 ENCOUNTER — TELEPHONE (OUTPATIENT)
Dept: GENERAL RADIOLOGY | Facility: HOSPITAL | Age: 67
End: 2017-08-04

## 2017-08-04 ENCOUNTER — TELEPHONE (OUTPATIENT)
Dept: ONCOLOGY | Facility: HOSPITAL | Age: 67
End: 2017-08-04

## 2017-08-04 ENCOUNTER — TELEPHONE (OUTPATIENT)
Dept: ONCOLOGY | Facility: CLINIC | Age: 67
End: 2017-08-04

## 2017-08-04 NOTE — TELEPHONE ENCOUNTER
I have talked with the patient on the telephone today in regard to the analysis of her peripheral blood flow cytometry that shows recurrence of her CLL.  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  The patient is a feels fine but I do believe that she is going to require to be seen at [facility] in consideration of participation in clinical trial, or the reason the  will go ahead and make an appointment to be seen in that institution in the next several weeks; COPY OF REPORT SENT TO PATIENT by mail; SHE AGREES.

## 2017-08-04 NOTE — TELEPHONE ENCOUNTER
----- Message from Poli Minaya MD sent at 8/4/2017 10:12 AM EDT -----  MAKE AN THUY TO SEE HER MD AT [facility] FOR CLL, BE SURE THEY GET REPORT OF RECENT FLOW AND FISH.READ MY PHONE NOTE

## 2017-08-04 NOTE — TELEPHONE ENCOUNTER
----- Message from Sharron Sierra RN sent at 8/4/2017  4:29 PM EDT -----  See below message from patient and Dr. Parker.  If you have issues with his schedule, please discuss with him:      FINE, LOOK FOR TIME TO SEE HER BECAUSE I HAVE VACATION TIME COMING UP    DR. PARKER      I would like to move the appointment for my CT scan earlier than October 2.  Maybe move for the week of August 14.  Can you please to Dr. Parker about it and let me know

## 2017-08-07 ENCOUNTER — TELEPHONE (OUTPATIENT)
Dept: ONCOLOGY | Facility: CLINIC | Age: 67
End: 2017-08-07

## 2017-08-07 LAB — REF LAB TEST METHOD: NORMAL

## 2017-08-07 NOTE — TELEPHONE ENCOUNTER
"Patient requesting that her FISH results be released to \"my Chart\".  Message sent to Dr. Minaya to see if he is able to release that.   "

## 2017-08-08 ENCOUNTER — TELEPHONE (OUTPATIENT)
Dept: ONCOLOGY | Facility: HOSPITAL | Age: 67
End: 2017-08-08

## 2017-08-08 NOTE — TELEPHONE ENCOUNTER
Patient calling to have test results mailed to her. Informed her we had mailed those out Friday afternoon so they might not have went out until Monday. Pt v/u

## 2017-08-15 ENCOUNTER — APPOINTMENT (OUTPATIENT)
Dept: LAB | Facility: HOSPITAL | Age: 67
End: 2017-08-15

## 2017-09-07 ENCOUNTER — HOSPITAL ENCOUNTER (OUTPATIENT)
Dept: PET IMAGING | Facility: HOSPITAL | Age: 67
Discharge: HOME | End: 2017-09-07
Attending: INTERNAL MEDICINE | Admitting: INTERNAL MEDICINE

## 2017-09-07 DIAGNOSIS — C91.10 CHRONIC LYMPHOID LEUKEMIA (HCC): ICD-10-CM

## 2017-09-07 LAB — CREAT BLDA-MCNC: 0.8 MG/DL (ref 0.6–1.3)

## 2017-09-07 PROCEDURE — 0 DIATRIZOATE MEGLUMINE & SODIUM PER 1 ML: Performed by: INTERNAL MEDICINE

## 2017-09-07 PROCEDURE — 0 IOPAMIDOL 61 % SOLUTION: Performed by: INTERNAL MEDICINE

## 2017-09-07 PROCEDURE — 74177 CT ABD & PELVIS W/CONTRAST: CPT

## 2017-09-07 PROCEDURE — 82565 ASSAY OF CREATININE: CPT

## 2017-09-07 PROCEDURE — 71260 CT THORAX DX C+: CPT

## 2017-09-07 RX ADMIN — DIATRIZOATE MEGLUMINE AND DIATRIZOATE SODIUM 30 ML: 660; 100 LIQUID ORAL; RECTAL at 07:25

## 2017-09-07 RX ADMIN — IOPAMIDOL 85 ML: 612 INJECTION, SOLUTION INTRAVENOUS at 08:15

## 2017-09-15 ENCOUNTER — TELEPHONE (OUTPATIENT)
Dept: ONCOLOGY | Facility: HOSPITAL | Age: 67
End: 2017-09-15

## 2017-09-15 ENCOUNTER — TELEPHONE (OUTPATIENT)
Dept: ONCOLOGY | Facility: CLINIC | Age: 67
End: 2017-09-15

## 2017-09-15 NOTE — TELEPHONE ENCOUNTER
SHE IS WORRIED ABOUT MEASUREMENTS IN THORACIC LN , SHE IS CORRECT IT SHOULD BE MM NOT CM, THIS WILL  BE CORRECTED

## 2017-09-15 NOTE — TELEPHONE ENCOUNTER
Patient calling to speak to Dr Minaya about CT scan results. Pt already sent us an email this morning with this request. I forwarded that to Dr Minaya. Attempted to call patient back to let her know this. No answer. LVM

## 2017-10-09 ENCOUNTER — OFFICE VISIT (OUTPATIENT)
Dept: ONCOLOGY | Facility: CLINIC | Age: 67
End: 2017-10-09

## 2017-10-09 ENCOUNTER — TELEPHONE (OUTPATIENT)
Dept: ONCOLOGY | Facility: HOSPITAL | Age: 67
End: 2017-10-09

## 2017-10-09 ENCOUNTER — LAB (OUTPATIENT)
Dept: LAB | Facility: HOSPITAL | Age: 67
End: 2017-10-09

## 2017-10-09 VITALS
WEIGHT: 104 LBS | OXYGEN SATURATION: 98 % | RESPIRATION RATE: 12 BRPM | DIASTOLIC BLOOD PRESSURE: 74 MMHG | SYSTOLIC BLOOD PRESSURE: 116 MMHG | BODY MASS INDEX: 17.75 KG/M2 | TEMPERATURE: 98.3 F | HEIGHT: 64 IN | HEART RATE: 61 BPM

## 2017-10-09 DIAGNOSIS — C91.10 CHRONIC LYMPHOID LEUKEMIA (HCC): ICD-10-CM

## 2017-10-09 DIAGNOSIS — C91.10 CHRONIC LYMPHOID LEUKEMIA (HCC): Primary | ICD-10-CM

## 2017-10-09 LAB
ALBUMIN SERPL-MCNC: 4.1 G/DL (ref 3.5–5.2)
ALBUMIN/GLOB SERPL: 1.3 G/DL (ref 1.1–2.4)
ALP SERPL-CCNC: 84 U/L (ref 38–116)
ALT SERPL W P-5'-P-CCNC: 21 U/L (ref 0–33)
ANION GAP SERPL CALCULATED.3IONS-SCNC: 12.6 MMOL/L
AST SERPL-CCNC: 26 U/L (ref 0–32)
B2 MICROGLOB SERPL-MCNC: 2.7 MG/L (ref 0.8–2.2)
BASOPHILS # BLD AUTO: 0.09 10*3/MM3 (ref 0–0.1)
BASOPHILS NFR BLD AUTO: 0.6 % (ref 0–1.1)
BILIRUB SERPL-MCNC: 0.4 MG/DL (ref 0.1–1.2)
BUN BLD-MCNC: 17 MG/DL (ref 6–20)
BUN/CREAT SERPL: 24.3 (ref 7.3–30)
CALCIUM SPEC-SCNC: 9.3 MG/DL (ref 8.5–10.2)
CHLORIDE SERPL-SCNC: 103 MMOL/L (ref 98–107)
CO2 SERPL-SCNC: 27.4 MMOL/L (ref 22–29)
CREAT BLD-MCNC: 0.7 MG/DL (ref 0.6–1.1)
DEPRECATED RDW RBC AUTO: 45.8 FL (ref 37–49)
EOSINOPHIL # BLD AUTO: 0.51 10*3/MM3 (ref 0–0.36)
EOSINOPHIL NFR BLD AUTO: 3.3 % (ref 1–5)
ERYTHROCYTE [DISTWIDTH] IN BLOOD BY AUTOMATED COUNT: 13.4 % (ref 11.7–14.5)
GFR SERPL CREATININE-BSD FRML MDRD: 83 ML/MIN/1.73
GLOBULIN UR ELPH-MCNC: 3.1 GM/DL (ref 1.8–3.5)
GLUCOSE BLD-MCNC: 80 MG/DL (ref 74–124)
HCT VFR BLD AUTO: 40.2 % (ref 34–45)
HGB BLD-MCNC: 13.2 G/DL (ref 11.5–14.9)
IMM GRANULOCYTES # BLD: 0.05 10*3/MM3 (ref 0–0.03)
IMM GRANULOCYTES NFR BLD: 0.3 % (ref 0–0.5)
LDH SERPL-CCNC: 237 U/L (ref 99–259)
LYMPHOCYTES # BLD AUTO: 7.78 10*3/MM3 (ref 1–3.5)
LYMPHOCYTES NFR BLD AUTO: 50.2 % (ref 20–49)
MCH RBC QN AUTO: 30.5 PG (ref 27–33)
MCHC RBC AUTO-ENTMCNC: 32.8 G/DL (ref 32–35)
MCV RBC AUTO: 92.8 FL (ref 83–97)
MONOCYTES # BLD AUTO: 1.46 10*3/MM3 (ref 0.25–0.8)
MONOCYTES NFR BLD AUTO: 9.4 % (ref 4–12)
NEUTROPHILS # BLD AUTO: 5.62 10*3/MM3 (ref 1.5–7)
NEUTROPHILS NFR BLD AUTO: 36.2 % (ref 39–75)
NRBC BLD MANUAL-RTO: 0 /100 WBC (ref 0–0)
PLATELET # BLD AUTO: 217 10*3/MM3 (ref 150–375)
PMV BLD AUTO: 10.3 FL (ref 8.9–12.1)
POTASSIUM BLD-SCNC: 3.9 MMOL/L (ref 3.5–4.7)
PROT SERPL-MCNC: 7.2 G/DL (ref 6.3–8)
RBC # BLD AUTO: 4.33 10*6/MM3 (ref 3.9–5)
SODIUM BLD-SCNC: 143 MMOL/L (ref 134–145)
WBC NRBC COR # BLD: 15.51 10*3/MM3 (ref 4–10)

## 2017-10-09 PROCEDURE — 83615 LACTATE (LD) (LDH) ENZYME: CPT | Performed by: INTERNAL MEDICINE

## 2017-10-09 PROCEDURE — 82232 ASSAY OF BETA-2 PROTEIN: CPT | Performed by: INTERNAL MEDICINE

## 2017-10-09 PROCEDURE — 99213 OFFICE O/P EST LOW 20 MIN: CPT | Performed by: INTERNAL MEDICINE

## 2017-10-09 PROCEDURE — 85025 COMPLETE CBC W/AUTO DIFF WBC: CPT | Performed by: INTERNAL MEDICINE

## 2017-10-09 PROCEDURE — 80053 COMPREHEN METABOLIC PANEL: CPT | Performed by: INTERNAL MEDICINE

## 2017-10-09 PROCEDURE — 36415 COLL VENOUS BLD VENIPUNCTURE: CPT | Performed by: INTERNAL MEDICINE

## 2017-10-09 RX ORDER — AZITHROMYCIN 250 MG/1
TABLET, FILM COATED ORAL
Qty: 6 TABLET | Refills: 0 | Status: SHIPPED | OUTPATIENT
Start: 2017-10-09 | End: 2017-12-15

## 2017-10-09 RX ORDER — AZITHROMYCIN 250 MG/1
500 TABLET, FILM COATED ORAL DAILY
Status: CANCELLED | OUTPATIENT
Start: 2017-10-09 | End: 2017-10-10

## 2017-10-09 RX ORDER — AZITHROMYCIN 250 MG/1
250 TABLET, FILM COATED ORAL DAILY
Status: CANCELLED | OUTPATIENT
Start: 2017-10-10 | End: 2017-10-14

## 2017-10-09 NOTE — PROGRESS NOTES
Subjective  :  Chronic lymphoid leukemia     The patient has completed 6 cycles of Treanda/Rituxan and on 05/03/2016 we advised the patient to proceed with further assessment including bone marrow aspirate and biopsy on the left pelvic bone. The patient also had a CT scan of the chest, abdomen and pelvis that showed resolution of all her areas of disease, especially retroperitoneal adenopathy. Her white count has normalized, white count differential is normal, hemoglobin is normal, platelet count is normal. She has no palpable adenopathies, no B symptoms, no infections and no autoimmunity. The patient will remain in observation returning to see us in a month to discuss analysis of her bone marrow testing.  During the previous visit the patient requested 3rd opinion at [facility]. . The patient states that her physician told her that she looks like she had achieved a very good remission in regard to her CLL and she was advised at the time of relapse to go into a clinical trial.              History of Present Illness   So far the patient is not having any fevers, chills, adenopathy, rashes in the skin or fatigue. She has not developed any pain related to malignancy. Her weight and appetite remain normal. Her bowel activity and urination remain normal. She has no cardiorespiratory symptomatology at this time. She remains fully functional, ECOG performance status 0.    Past Medical History, Past Surgical History:.  :  Scoliosis; this gives her back pain.      No history of hypertension, coronary artery disease, cardiac arrhythmia, chronic obstructive pulmonary disease, asthma, emphysema, pleurisy, or gastrointestinal disorders of any nature.  She has no history of peptic ulcer disease.  The patient has no history of hepatitis, pancreatitis, or colitis.  She has never had a colonoscopy.      She has no history of neurological disorder, no history of psychiatric illnesses.     SURGICAL HISTORY:     1. Replacement of the left shoulder in  in [place], [place] because of osteoarthritis, trauma, and pain.    2. Left ovary removed and part of the right ovary because of cyst formations; this was done in  in [place], [place].  No malignancy was documented.    3. Left shoulder revision in .   4. Nasal septum correction years ago.     GYN HISTORY:  Her pelvic exams have been normal.  Mammograms and self-breast examinations have been normal.  She has never had abnormal Pap smears.  She is a  0, para 0.      Review of Systems   General: no fever, chills, fatigue, weight changes, or lack of appetite.  Eyes: no epiphora, xerophthalmia,conjunctivitis, pain, glaucoma, blurred vision, blindness, secretion, photophobia, proptosis, diplopia.  Ears: no otorrhea, tinnitus, otorrhagia, deafness, pain, vertigo.  Nose: no rhinorrhea, epistaxis, alteration in perception of odors, sinuses pressure.  Mouth: no alteration in gums or teeth,  ulcers, no difficulty with mastication or deglut ion, no odynophagia.  Neck: no masses or pain, no thyroid alterations, no pain in muscles or arteries, no carotid odynia, no crepitation.  Respiratory: no cough, sputum production, dyspnea, trepopnea, pleuritic pain, hemoptysis.  Heart: no syncope, irregularity, palpitations, angina, orthopnea, paroxysmal nocturnal dyspnea.  Vascular Venous: no tenderness,edema, palpable cords, postphlebitic syndrome, skin changes or ulcerations.  Vascular Arterial: no distal ischemia, claudication, gangrene, neuropathic ischemic pain, skin ulcers, paleness or cyanosis.  GI: no dysphagia, odynophagia, no regurgitation, no heartburn,no indigestion,no nausea,no vomiting,no hematemesis ,no melena,no jaundice,no distention, no obstipation,no enterorrhagia,no proctalgia,no anal  lesions, nochanges in bowel habits.  : no frequency, hesitancy, hematuria, discharge, pain.  Musculoskeletal: no muscle or tendon pain or inflammation, joint pain,  "edema, functional limitation, fasciculations, mass.  Neurologic: no headache, seizures, alterations on Craneal nerves, no motor or senssory deficit, normal coordination, no alteration in memory, orientation, calculation,writting, verbal or written language.  Skin: no rashes, pruritus or localized lesions.  Psychiatric: no anxiety, depression, agitation, delusions, proper insight.  Medications:  The current medication list was reviewed in the EMR    ALLERGIES:    Allergies   Allergen Reactions   • Asendin [Amoxapine]    • Diphenhydramine Hcl Other (See Comments)     Redness,  Pt prefers not to take       Objective      Vitals:    10/09/17 0819   BP: 116/74   Pulse: 61   Resp: 12   Temp: 98.3 °F (36.8 °C)   TempSrc: Oral   SpO2: 98%   Weight: 104 lb (47.2 kg)   Height: 63.78\" (162 cm)  Comment: new height   PainSc: 0-No pain     Current Status 7/31/2017   ECOG score 0   no pain    Physical Exam    GENERAL:  Well-developed, well-nourished  Patient  in no acute distress.   SKIN:  Warm, dry without rashes, purpura or petechiae.  HEENT:  Pupils were equal and reactive to light and accomodation, conjunctivas non injected, no pterigion, normal extraocular movements, normal visual acuity.   Mouth mucosa was moist, no exudates in oropharynx, normal gum line, normal roof of the mouth and pillars, normal papillations of the tongue.Ear canals were normal, as well tympanic membranes, normal hearing acuity.No pain in mastoid area or erythema.  NECK:  Supple with good range of motion; no thyromegaly or masses, no JVD or bruits, no cervical adenopathies.No carotid arteries pain, no carotid abnormal pulsation or arterial dance.  LYMPHATICS:  No cervical, supraclavicular, axillary, epitrochlear or inguinal adenopathy.  CHEST:  Normal excursion of both qian thoraces, normal voice fremitus, no subcutaneous emphysema, normal axillas, no rashes or acanthosis nigricans. Lungs clear to percussion and auscultation, normal breath sounds " bilaterally, no wheezing, crackles or ronchi, no stridor, no rubs.  CARDIAC AND VASCULAR: PMI not displaced,no thrills, normal rate and regular rhythm, without murmurs, rubs or S3 or S4 right or left sided gallops. Normal femoral, popliteal, pedis, brachial and carotid pulses.  ABDOMEN:  Soft, nontender with no organomegaly or masses, no ascites, no collateral circulation,no distention,no [place] sign, no abdominal pain, no inguinal hernias,no umbilical hernias, no abdominal bruits. Normal bowel sounds.  GENITAL: Not  Performed.  EXTREMITIES  AND SPINE:  No clubbing, cyanosis or edema, no deformities or pain .no changes in her  kyphosis, andscoliosis,or pain in spine.  NEUROLOGICAL:  Patient was awake, alert, oriented to time, person and place,normal gait and coordination, negative Romberg.  GRECENT LABS:  Hematology WBC   Date Value Ref Range Status   10/09/2017 15.51 (H) 4.00 - 10.00 10*3/mm3 Final     RBC   Date Value Ref Range Status   10/09/2017 4.33 3.90 - 5.00 10*6/mm3 Final     Hemoglobin   Date Value Ref Range Status   10/09/2017 13.2 11.5 - 14.9 g/dL Final     Hematocrit   Date Value Ref Range Status   10/09/2017 40.2 34.0 - 45.0 % Final     Platelets   Date Value Ref Range Status   10/09/2017 217 150 - 375 10*3/mm3 Final        Component      Latest Ref Rng & Units 6/1/2017 7/31/2017 10/9/2017           9:30 AM  9:03 AM  7:59 AM   WBC      4.00 - 10.00 10*3/mm3 8.97 11.39 (H) 15.51 (H)   RBC      3.90 - 5.00 10*6/mm3 4.57 4.56 4.33   Hemoglobin      11.5 - 14.9 g/dL 13.9 14.2 13.2   Hematocrit      34.0 - 45.0 % 42.6 42.7 40.2   MCV      83.0 - 97.0 fL 93.2 93.6 92.8   MCH      27.0 - 33.0 pg 30.4 31.1 30.5   MCHC      32.0 - 35.0 g/dL 32.6 33.3 32.8   RDW      11.7 - 14.5 % 13.7 13.2 13.4   RDW-SD      37.0 - 49.0 fl 46.1 45.6 45.8   MPV      8.9 - 12.1 fL 9.9 10.8 10.3   Platelets      150 - 375 10*3/mm3 248 187 217   Neutrophil %      39.0 - 75.0 % 46.0 37.2 (L) 36.2 (L)   Lymphocyte %      20.0 - 49.0  % 37.5 53.2 (H) 50.2 (H)   Monocyte %      4.0 - 12.0 % 11.4 5.9 9.4   Eosinophil %      1.0 - 5.0 % 4.0 2.6 3.3   Basophil %      0.0 - 1.1 % 0.8 0.8 0.6   Immature Grans %      0.0 - 0.5 % 0.3 0.3 0.3   Neutrophils, Absolute      1.50 - 7.00 10*3/mm3 4.13 4.24 5.62   Lymphocytes, Absolute      1.00 - 3.50 10*3/mm3 3.36 6.06 (H) 7.78 (H)   Monocytes, Absolute      0.25 - 0.80 10*3/mm3 1.02 (H) 0.67 1.46 (H)   Addendum   Heriberto Singer MD   Mon Sep 18, 2017  3:13:12 PM EDT   ADDENDUM: Precarinal node measures 19 x 14 mm [not cm as mistyped in the  findings section of the above report.]      This report was finalized on 9/18/2017 3:13 PM by Dr. Heriberto Singer MD.       Study Result   CT CHEST, ABDOMEN AND PELVIS WITH INTRAVENOUS CONTRAST      HISTORY: Chronic lymphoid leukemia. Follow-up exam.      TECHNIQUE: CT chest, abdomen and pelvis with intravenous and oral  contrast.      COMPARISON: CT chest, abdomen and pelvis with IV contrast 04/29/2016,  10/14/2015 and 06/22/2015.      FINDINGS:  CHEST: There is infiltrative mediastinal and hilar deana tissue with  progression when compared to CT April 2016. Precarinal node measures  approximately 19 x 14 cm. Subcarinal node measures 19 x 13 mm. A left  hilar lymph node measures 13 mm. There are small bilateral axillary  nodes. A left axillary/subpectoral node measures 16 x 6 mm. The lungs  are clear. There is no pleural effusion. Calcified granuloma is present  left lower lobe.      ABDOMEN/PELVIS: There is retroperitoneal deana enlargement with increase  in the size of the lymph nodes when compared to the previous exam.  Confluent left periaortic deana mass or adjacent masses measures  approximately 43 x 21 mm. A periportal node measures 13 x 12 mm. Splenic  size is within normal limits. The liver, left kidney, pancreas are  within normal limits. Right adrenal nodule is without change. There is  no bowel dilatation or evidence for bowel obstruction.  Several  calcifications are present in the subcutaneous fat superficial to the  gluteus carmela musculature due to injection granulomas.      IMPRESSION:  Progression of disease with increase in size of lymph nodes within the  retroperitoneum, periportal region, mediastinum, lori.      This report was finalized on 9/8/2017 10:44 AM by Dr. Heriberto Singer MD.           Assessment/Plan     Chronic lymphoid leukemia     This patient has completed 6 cycles of Treanda/Rituxan with excellent tolerance one year ago. She has not developed any infections or autoimmunity. Her physical examination today is normal with resolution of her palpable cervical and axillary adenopathies. She has no B symptoms. The rest of her examination remains unchanged.  It is very clear that the patient’s CT scan shows progressive adenopathy in the intraabdominal area as stated above and again, an addendum has been dictated to the measurements stated by the radiologist.  Her white count has duplicated since June, her hemoglobin and platelets remain stable and again, she has no bulky disease, no palpable adenopathies, no B symptoms, no infections and no autoimmunity.  She is going to be seeing physicians at [facility] in 2 weeks and thereafter she will be calling me in regard to what she is going to do.  She understands that sooner or later we need to pull the trigger in regard to treatment and obviously probably the best choice will be [facility] unless she participates in a clinical trial at [facility].      Once that she is seen at [facility] she will give us a call and then we will decide how to proceed and further appointments.    I gave her copies of all her reports and she has disc of the CT scans to take to the [facility] visit in 2 weeks.                    10/9/2017      CC:

## 2017-10-09 NOTE — TELEPHONE ENCOUNTER
Pt calling for zpack prescription to be sent in. Reviewed with Dr Minaya, he thought he had already sent it in. I do not see that prescription in the chart. Will send in script to [facility]. Called and LVM notifying patient.

## 2017-12-15 ENCOUNTER — LAB (OUTPATIENT)
Dept: LAB | Facility: HOSPITAL | Age: 67
End: 2017-12-15

## 2017-12-15 ENCOUNTER — OFFICE VISIT (OUTPATIENT)
Dept: ONCOLOGY | Facility: CLINIC | Age: 67
End: 2017-12-15

## 2017-12-15 VITALS
HEIGHT: 64 IN | DIASTOLIC BLOOD PRESSURE: 80 MMHG | WEIGHT: 106.4 LBS | RESPIRATION RATE: 16 BRPM | BODY MASS INDEX: 18.16 KG/M2 | SYSTOLIC BLOOD PRESSURE: 132 MMHG | TEMPERATURE: 98.4 F | HEART RATE: 62 BPM

## 2017-12-15 DIAGNOSIS — C91.10 CHRONIC LYMPHOID LEUKEMIA (HCC): Primary | ICD-10-CM

## 2017-12-15 LAB
BASOPHILS # BLD AUTO: 0.13 10*3/MM3 (ref 0–0.1)
BASOPHILS NFR BLD AUTO: 0.6 % (ref 0–1.1)
DEPRECATED RDW RBC AUTO: 45.1 FL (ref 37–49)
EOSINOPHIL # BLD AUTO: 0.38 10*3/MM3 (ref 0–0.36)
EOSINOPHIL NFR BLD AUTO: 1.6 % (ref 1–5)
ERYTHROCYTE [DISTWIDTH] IN BLOOD BY AUTOMATED COUNT: 13.6 % (ref 11.7–14.5)
HCT VFR BLD AUTO: 44.3 % (ref 34–45)
HGB BLD-MCNC: 15 G/DL (ref 11.5–14.9)
IMM GRANULOCYTES # BLD: 0.07 10*3/MM3 (ref 0–0.03)
IMM GRANULOCYTES NFR BLD: 0.3 % (ref 0–0.5)
LYMPHOCYTES # BLD AUTO: 13.94 10*3/MM3 (ref 1–3.5)
LYMPHOCYTES NFR BLD AUTO: 60.1 % (ref 20–49)
MCH RBC QN AUTO: 30.6 PG (ref 27–33)
MCHC RBC AUTO-ENTMCNC: 33.9 G/DL (ref 32–35)
MCV RBC AUTO: 90.4 FL (ref 83–97)
MONOCYTES # BLD AUTO: 2.63 10*3/MM3 (ref 0.25–0.8)
MONOCYTES NFR BLD AUTO: 11.3 % (ref 4–12)
NEUTROPHILS # BLD AUTO: 6.04 10*3/MM3 (ref 1.5–7)
NEUTROPHILS NFR BLD AUTO: 26.1 % (ref 39–75)
NRBC BLD MANUAL-RTO: 0 /100 WBC (ref 0–0)
PLATELET # BLD AUTO: 201 10*3/MM3 (ref 150–375)
PMV BLD AUTO: 10.8 FL (ref 8.9–12.1)
RBC # BLD AUTO: 4.9 10*6/MM3 (ref 3.9–5)
WBC NRBC COR # BLD: 23.19 10*3/MM3 (ref 4–10)

## 2017-12-15 PROCEDURE — 85025 COMPLETE CBC W/AUTO DIFF WBC: CPT | Performed by: INTERNAL MEDICINE

## 2017-12-15 PROCEDURE — 99213 OFFICE O/P EST LOW 20 MIN: CPT | Performed by: INTERNAL MEDICINE

## 2017-12-15 PROCEDURE — 36415 COLL VENOUS BLD VENIPUNCTURE: CPT | Performed by: INTERNAL MEDICINE

## 2017-12-15 RX ORDER — INFLUENZA A VIRUS A/MICHIGAN/45/2015 X-275 (H1N1) ANTIGEN (FORMALDEHYDE INACTIVATED), INFLUENZA A VIRUS A/SINGAPORE/INFIMH-16-0019/2016 IVR-186 (H3N2) ANTIGEN (FORMALDEHYDE INACTIVATED), AND INFLUENZA B VIRUS B/MARYLAND/15/2016 BX-69A (A B/COLORADO/6/2017-LIKE VIRUS) ANTIGEN (FORMALDEHYDE INACTIVATED) 60; 60; 60 UG/.5ML; UG/.5ML; UG/.5ML
INJECTION, SUSPENSION INTRAMUSCULAR
Refills: 0 | COMMUNITY
Start: 2017-11-02 | End: 2018-05-31

## 2018-01-10 ENCOUNTER — TELEPHONE (OUTPATIENT)
Dept: ONCOLOGY | Facility: CLINIC | Age: 68
End: 2018-01-10

## 2018-01-10 NOTE — TELEPHONE ENCOUNTER
PHONE WITH PT: SEEN AT [facility], PLAN TO GO BACK IN 2 WEEKS FOR PRE TESTING PARTICIPATION IN CLINICAL TRIAL THERE, I WILL CANCEL THUY HERE, CALL IF ANY OTHER QUESTIONS OR CONCERNS, SHE FEELS FINE

## 2018-02-08 ENCOUNTER — TELEPHONE (OUTPATIENT)
Dept: ONCOLOGY | Facility: CLINIC | Age: 68
End: 2018-02-08

## 2018-02-08 NOTE — TELEPHONE ENCOUNTER
Phone with pt: she will start participation in clinical trial at [facility] next week, I will review records, keep cb the same in the future or call if any problems

## 2018-05-14 ENCOUNTER — TELEPHONE (OUTPATIENT)
Dept: GENERAL RADIOLOGY | Facility: HOSPITAL | Age: 68
End: 2018-05-14

## 2018-05-14 ENCOUNTER — TELEPHONE (OUTPATIENT)
Dept: ONCOLOGY | Facility: CLINIC | Age: 68
End: 2018-05-14

## 2018-05-14 DIAGNOSIS — I67.1 ANEURYSM OF MIDDLE CEREBRAL ARTERY: Primary | ICD-10-CM

## 2018-05-14 NOTE — TELEPHONE ENCOUNTER
----- Message from Poli Minaya MD sent at 5/14/2018 10:15 AM EDT -----  Read my phone note make referral.

## 2018-05-14 NOTE — TELEPHONE ENCOUNTER
Phone with pt: seen reports from [facility], aneurism 5 mm left mca, no symptoms, referral made to Dr Ramirez for review.She agrees; she is on clinical trial in that institution for her cll.    FINDINGS:report [facility]    Again noted is asymmetric enlargement of the right laryngeal ventricle and  right piriform sinus. This is nonspecific but can be seen in the setting of  right vocal cord paresis. Clinical correlation is recommended.    Parotid, submandibular and thyroid glands are unremarkable.    Small scattered lymph nodes are seen in the suprahyoid and infrahyoid neck  bilaterally without evidence of adenopathy by standard CT criteria.     Visualized skull base, sinuses, and orbits are unremarkable.    As was noted on the previous examination, there is evidence of a 5.3 x 3.6 mm  left MCA bifurcation aneurysm.

## 2018-05-31 RX ORDER — ALLOPURINOL 300 MG/1
300 TABLET ORAL DAILY
Refills: 1 | COMMUNITY
Start: 2018-04-27 | End: 2020-10-22 | Stop reason: ALTCHOICE

## 2018-05-31 RX ORDER — ACYCLOVIR 800 MG/1
800 TABLET ORAL DAILY
COMMUNITY

## 2018-05-31 RX ORDER — UBIDECARENONE 75 MG
50 CAPSULE ORAL DAILY
COMMUNITY

## 2018-05-31 RX ORDER — AMLODIPINE BESYLATE 5 MG/1
TABLET ORAL
Refills: 6 | COMMUNITY
Start: 2018-04-28 | End: 2020-11-23 | Stop reason: SDUPTHER

## 2018-05-31 RX ORDER — PHENOL 1.4 %
600 AEROSOL, SPRAY (ML) MUCOUS MEMBRANE DAILY
COMMUNITY

## 2018-05-31 RX ORDER — BIOTIN 1 MG
2000 TABLET ORAL DAILY
COMMUNITY

## 2018-06-11 ENCOUNTER — TRANSCRIBE ORDERS (OUTPATIENT)
Dept: ADMINISTRATIVE | Facility: HOSPITAL | Age: 68
End: 2018-06-11

## 2018-06-11 DIAGNOSIS — I67.1 BRAIN ANEURYSM: Primary | ICD-10-CM

## 2018-06-12 ENCOUNTER — OFFICE VISIT (OUTPATIENT)
Dept: NEUROSURGERY | Facility: CLINIC | Age: 68
End: 2018-06-12

## 2018-06-12 VITALS
DIASTOLIC BLOOD PRESSURE: 90 MMHG | SYSTOLIC BLOOD PRESSURE: 160 MMHG | WEIGHT: 103 LBS | HEIGHT: 64 IN | RESPIRATION RATE: 16 BRPM | BODY MASS INDEX: 17.58 KG/M2 | HEART RATE: 72 BPM

## 2018-06-12 DIAGNOSIS — I67.1 CEREBRAL ANEURYSM WITHOUT RUPTURE: Primary | ICD-10-CM

## 2018-06-12 DIAGNOSIS — C91.10 CHRONIC LYMPHOID LEUKEMIA (HCC): ICD-10-CM

## 2018-06-12 PROCEDURE — 99204 OFFICE O/P NEW MOD 45 MIN: CPT | Performed by: NEUROLOGICAL SURGERY

## 2018-06-12 RX ORDER — AMOXICILLIN 500 MG/1
500 CAPSULE ORAL
COMMUNITY

## 2018-06-12 RX ORDER — ASCORBIC ACID 500 MG
500 TABLET ORAL DAILY
COMMUNITY

## 2018-06-12 NOTE — PROGRESS NOTES
Subjective   Patient ID: Abigail Rizzo is a 67 y.o. female is being seen for consultation today at the request of Poli Minaya MD for incidental finding of cerebral aneurysm. She is accompanied by her , Denilson.    History of Present Illness     The patient is a 67-year-old right-handed woman who presents today following the incidental finding of a cerebral aneurysm that was found because of the need for imaging for a clinical trial for chronic lymphocytic leukemia.    He presents for neurosurgical consultation with regard to appropriate management of a 5.3 mm left middle cerebral artery asymptomatic cerebral aneurysm.    The patient specifically denies symptoms that would be worrisome for rupture of the cerebral aneurysm.  She notes that she has some sinus headaches at times which are in the frontal area of her face.  She denies the sudden onset of a very severe headache.  She denies seizure activity or numbness or tingling or weakness.        The following portions of the patient's history were reviewed and updated as appropriate: allergies, current medications, past family history, past medical history, past social history, past surgical history and problem list.    Review of Systems   Constitutional: Negative for fever.   HENT: Negative for tinnitus and trouble swallowing.    Eyes: Negative for visual disturbance.   Respiratory: Negative for cough and wheezing.    Cardiovascular: Negative for chest pain and palpitations.   Gastrointestinal: Negative for abdominal pain.   Genitourinary: Negative for difficulty urinating.   Musculoskeletal: Negative for gait problem.   Skin: Negative for rash.   Neurological: Negative for dizziness, speech difficulty, light-headedness and headaches.   Psychiatric/Behavioral: Negative for decreased concentration.       Objective   Physical Exam   Constitutional: She is oriented to person, place, and time.   Eyes: EOM are normal. Pupils are equal, round, and reactive to light.    Neck: Normal carotid pulses present. Carotid bruit is not present.   Neurological: She is oriented to person, place, and time. She has a normal Finger-Nose-Finger Test, a normal Heel to Shin Test, a normal Romberg Test and a normal Tandem Gait Test. Gait normal.   Reflex Scores:       Tricep reflexes are 2+ on the right side and 2+ on the left side.       Bicep reflexes are 2+ on the right side and 2+ on the left side.       Brachioradialis reflexes are 2+ on the right side and 2+ on the left side.       Patellar reflexes are 2+ on the right side and 2+ on the left side.       Achilles reflexes are 2+ on the right side and 2+ on the left side.  Psychiatric: Her speech is normal.     Neurologic Exam     Mental Status   Oriented to person, place, and time.   Registration: recalls 3 of 3 objects. Recall at 5 minutes: recalls 3 of 3 objects. Follows 3 step commands.   Attention: normal. Concentration: normal.   Speech: speech is normal   Level of consciousness: alert  Knowledge: good.   Able to name object. Able to read. Able to repeat. Able to write. Normal comprehension.     Cranial Nerves     CN II   Visual fields full to confrontation.     CN III, IV, VI   Pupils are equal, round, and reactive to light.  Extraocular motions are normal.   Right pupil: Consensual response: intact.   Left pupil: Consensual response: intact.   CN III: no CN III palsy  CN VI: no CN VI palsy  Nystagmus: none   Diplopia: none  Ophthalmoparesis: none  Upgaze: normal  Downgaze: normal  Conjugate gaze: present  Vestibulo-ocular reflex: present    CN V   Facial sensation intact.     CN VII   Facial expression full, symmetric.     CN VIII   CN VIII normal.     CN IX, X   CN IX normal.     CN XI   CN XI normal.     CN XII   CN XII normal.     Motor Exam   Muscle bulk: normal  Overall muscle tone: normal  Right arm tone: normal  Left arm tone: normal  Right arm pronator drift: absent  Left arm pronator drift: absent  Right leg tone:  normal  Left leg tone: normal    Strength   Right neck flexion: 5/5  Left neck flexion: 5/5  Right neck extension: 5/5  Left neck extension: 5/5  Right deltoid: 5/5  Left deltoid: 5/5  Right biceps: 5/5  Left biceps: 5/5  Right triceps: 5/5  Left triceps: 5/5  Right wrist flexion: 5/5  Left wrist flexion: 5/5  Right wrist extension: 5/5  Left wrist extension: 5/5  Right interossei: 5/5  Left interossei: 5/5  Right abdominals: 5/5  Left abdominals: 5/5  Right iliopsoas: 5/5  Left iliopsoas: 5/5  Right quadriceps: 5/5  Left quadriceps: 5/5  Right hamstrin/5  Left hamstrin/5  Right glutei: 5/5  Left glutei: 5/5  Right anterior tibial: 5/5  Left anterior tibial: 5/5  Right posterior tibial: 5/5  Left posterior tibial: 5/5  Right peroneal: 5/5  Left peroneal: 5/5  Right gastroc: 5/5  Left gastroc: 5/5    Sensory Exam   Light touch normal.   Vibration normal.   Proprioception normal.   Pinprick normal.     Gait, Coordination, and Reflexes     Gait  Gait: normal    Coordination   Romberg: negative  Finger to nose coordination: normal  Heel to shin coordination: normal  Tandem walking coordination: normal    Tremor   Resting tremor: absent  Intention tremor: absent  Action tremor: absent    Reflexes   Right brachioradialis: 2+  Left brachioradialis: 2+  Right biceps: 2+  Left biceps: 2+  Right triceps: 2+  Left triceps: 2+  Right patellar: 2+  Left patellar: 2+  Right achilles: 2+  Left achilles: 2+  Right : 2+  Left : 2+  Right plantar: normal  Left plantar: normal  Right Connell: absent  Left Connell: absent  Right ankle clonus: absent  Left ankle clonus: absent      Assessment/Plan   Independent Review of Radiographic Studies:    I personally reviewed a CTA of the neck that included a portion of the base of skull[place].  There is evidence of a small left middle cerebral artery aneurysm measuring in its greatest diameter about 5.3 mm.  I compared to  CTA with the most recent CTA which  was done and believe in June.    There is no change in the size of the small lesion.  Medical Decision Making:    The patient presents with the above-noted history and physical findings.  There are no red flags.    I explained to the patient the problem with having a cerebral aneurysm is that there is a risk of bleeding from an aneurysm.  Bleeding from an aneurysm is not precipitated by any particular events.  Certainly good overall health practices are totally appropriate when one has an aneurysm in order to prevent hypertension, avoid the use of stimulant drugs, avoid the use of nasal sprays that might increase blood pressure or other medications such as oral decongestants that might increase blood pressure but otherwise there are no specific activity restrictions that I would impose because she harbors small cerebral aneurysm.    I also explained that the risk of rupture of the cerebral aneurysm over the next 5 years is less than 1%.  I explained that the treatment risks is approximately 5-6% of the stroke or hemorrhage or death as a result of treatment.  In general aneurysm that are less than 7 mm in size are considered to be appropriate for observation rather than treatment.  Of course if there is any change in the size of the configuration of the lesion then consideration of treatment would be appropriate because neurosurgery as a whole believes that aneurysms that are changing are at increased risk over the stated risk of less than 1%.      I went over the various options of treatment available for unruptured aneurysms. I explained that an aneurysm less than 7 mm in size carries less than a 1% risk of rupture on a yearly basis. I explained that the natural history of an aneurysm if it were to rupture included 30% of patients dying immediately. Of the survivors, 30% would die despite completely appropriate medical treatment, 30% would be severely disabled, and 30% would have a satisfactory result from  treatment of a ruptured aneurysm. I also explained the risks, benefits and alternatives of treatment of an aneurysm from either an endovascular or open surgical procedure.    I went over the signs of rupture/leakage of aneurysm including sudden onset of severe headaches, vision problems, balance changes, speech difficulties, loss of consciousness, seizures, nausea/vomiting, etcetera and instructed the patient to seek immediate medical attention should these occur.    The patient is to undergo a MRA in the very near future at [facility].  This is on the basis of the advice of a neurosurgeon at [facility] which is completely appropriate.  I think however that is very unlikely that we will find an aneurysm in another location aside from the [place] that would be large enough to require a surgical treatment.    Obviously if there is another aneurysm that is identified on the MRA I am extremely happy to see her back for further discussion however I think that this poor woman is going through a hole a lot of stuff with a lot of transfers back and forth between [facility] and [facility] in order to undergo the onerous process of a clinical trial and also dealing with the emotional aspects of suffering with a chronic illness.  Therefore adding yet had another physician into her middle you in less there is a change in the size of an aneurysm or there is a larger aneurysm was identified on the MRA seems a bit onerous.    If there is no other aneurysm identified on the MRA outside of the field of the CTAs that are being done as a routine part of her her clinical trial I see no particular reason to repeat MRA is routinely but rather simply to monitor this cerebral aneurysm with the clinical trial CTA.     In general I recommend that aneurysms of this size be followed every 2 years and so if she comes out of the clinical trial and it is no longer undergoing CTA study is routinely  that I be happy to see her back to arrange follow-up appropriately for this cerebral aneurysm.    Plan: F/U. OBINNA.      Abigail was seen today for cerebral aneurysm.    Diagnoses and all orders for this visit:    Cerebral aneurysm without rupture    Chronic lymphoid leukemia      Return if symptoms worsen or fail to improve.

## 2018-06-17 ENCOUNTER — APPOINTMENT (OUTPATIENT)
Dept: GENERAL RADIOLOGY | Facility: HOSPITAL | Age: 68
End: 2018-06-17

## 2018-06-17 ENCOUNTER — HOSPITAL ENCOUNTER (EMERGENCY)
Facility: HOSPITAL | Age: 68
Discharge: HOME | End: 2018-06-17
Attending: EMERGENCY MEDICINE | Admitting: EMERGENCY MEDICINE

## 2018-06-17 VITALS
BODY MASS INDEX: 17.93 KG/M2 | WEIGHT: 105 LBS | TEMPERATURE: 97.5 F | RESPIRATION RATE: 16 BRPM | DIASTOLIC BLOOD PRESSURE: 80 MMHG | SYSTOLIC BLOOD PRESSURE: 160 MMHG | OXYGEN SATURATION: 100 % | HEART RATE: 86 BPM | HEIGHT: 64 IN

## 2018-06-17 DIAGNOSIS — S76.012A HIP STRAIN, LEFT, INITIAL ENCOUNTER: Primary | ICD-10-CM
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
## 2018-06-17 PROCEDURE — 99282 EMERGENCY DEPT VISIT SF MDM: CPT

## 2018-06-17 PROCEDURE — 73502 X-RAY EXAM HIP UNI 2-3 VIEWS: CPT

## 2018-06-17 NOTE — ED TRIAGE NOTES
Pt tripped on a rug and fell Wednesday while at work. Was seen by [employer] and had xrays of her right ribs, right leg, and right elbow done. Here today due to increased pain in her left hip and pain in her right ribs. Pt ambulated in triage. Was given tramadol and tylenol with no relief. Pt appears to be in NAD at this time, skin is PWD, and breathing is even and unlabored.

## 2018-06-17 NOTE — ED PROVIDER NOTES
[facility] EMERGENCY DEPARTMENT ENCOUNTER    CHIEF COMPLAINT  Chief Complaint: hip pain  History given by: patient, spouse  History limited by: nothing  [facility] Room Number: 47/47  PMD: Wilmar Triana MD      HPI:  Pt is a 67 y.o. female who presents complaining of left hip pain s/p trip and fall at work 5 days ago. Pt states that she landed on her right side after the fall. Pt states that she has developed progressively worsening left hip pin since the fall. Pt states that she had negative XRs of her R ribs, R elbow and R leg at [facility] after the fall. Pt states that she has taken Tylenol and tramadol without relief.    Onset: gradual  Duration: 5 days  Severity: moderate to severe  Associated symptoms: none  Previous treatment: Pt states that she has taken Tylenol and tramadol without relief.    PAST MEDICAL HISTORY  Active Ambulatory Problems     Diagnosis Date Noted   • Chronic lymphoid leukemia 02/26/2016   • Cerebral aneurysm without rupture 06/12/2018     Resolved Ambulatory Problems     Diagnosis Date Noted   • No Resolved Ambulatory Problems     Past Medical History:   Diagnosis Date   • Cerebral aneurysm    • CLL (chronic lymphocytic leukemia)    • H/O Broken shoulder    • H/O Foot deformities    • H/O Ovarian cysts    • Hypertension    • Scoliosis        PAST SURGICAL HISTORY  Past Surgical History:   Procedure Laterality Date   • FOOT SURGERY      1979 to 2000 various foot deformities corrected   • NASAL SEPTUM SURGERY     • OOPHORECTOMY Left 1992    and part of right   • SHOULDER SURGERY Left 1989    Replacement of the left shoulder in 1989 in [place], [place] because of osteoarthitis, trauma, and pain   • SHOULDER SURGERY Left 2005    Shoulder revision       FAMILY HISTORY  Family History   Problem Relation Age of Onset   • Leukemia Mother 85        CLL   • Allergy (severe) Mother    • Hypertension Mother    • Skin cancer Mother 16   • Thrombosis Father         Cerebral thrombosis   • Bleeding Disorder  Sister    • Cervical cancer Sister 49       SOCIAL HISTORY  Social History     Social History   • Marital status:      Spouse name: Denilson   • Number of children: 0   • Years of education: College     Occupational History   •  [employer]     Social History Main Topics   • Smoking status: Never Smoker   • Smokeless tobacco: Never Used   • Alcohol use Yes      Comment: Occasional   • Drug use: No   • Sexual activity: Defer     Other Topics Concern   • Not on file     Social History Narrative   • No narrative on file       ALLERGIES  Asendin [amoxapine] and Diphenhydramine hcl    REVIEW OF SYSTEMS  Review of Systems   Constitutional: Negative for fever.   HENT: Negative for sore throat.    Eyes: Negative.    Respiratory: Negative for cough and shortness of breath.    Cardiovascular: Negative for chest pain.   Gastrointestinal: Negative for abdominal pain, diarrhea and vomiting.   Genitourinary: Negative for dysuria.   Musculoskeletal: Positive for arthralgias (L hip). Negative for neck pain.   Skin: Negative for rash.   Allergic/Immunologic: Negative.    Neurological: Negative for weakness, numbness and headaches.   Hematological: Negative.    Psychiatric/Behavioral: Negative.    All other systems reviewed and are negative.      PHYSICAL EXAM  ED Triage Vitals   Temp Heart Rate Resp BP SpO2   06/17/18 1136 06/17/18 1136 06/17/18 1136 06/17/18 1146 06/17/18 1136   97.5 °F (36.4 °C) 86 16 160/80 100 %      Temp src Heart Rate Source Patient Position BP Location FiO2 (%)   -- -- 06/17/18 1146 -- --     Sitting         Physical Exam   Constitutional: She is oriented to person, place, and time. No distress.   HENT:   Head: Normocephalic and atraumatic.   Eyes: EOM are normal. Pupils are equal, round, and reactive to light.   Neck: Normal range of motion. Neck supple.   Cardiovascular: Normal rate, regular rhythm and normal heart sounds.    Pulmonary/Chest: Effort  normal and breath sounds normal. No respiratory distress.   Musculoskeletal: Normal range of motion. She exhibits no edema.   Scoliosis of T-Spine. Healing ecchymosis to right knee and right lateral upper leg. Pt has tenderness over the left SI joint and left lateral hip.   Neurological: She is alert and oriented to person, place, and time. She has normal sensation and normal strength.   Skin: Skin is warm and dry. No rash noted.   Psychiatric: Mood and affect normal.   Nursing note and vitals reviewed.    RADIOLOGY  XR Hip With or Without Pelvis 2 - 3 View Left    (Results Pending)      L hip XR shows chronic changes to bilateral hips and L-Spine but no acute fracture.     I ordered the above noted radiological studies. Interpreted by radiologist. Reviewed by me in PACS.       PROCEDURES  Procedures      PROGRESS AND CONSULTS   1208- Discussed the plan to order imaging studies for further evaluation of her symptoms but that I believe that the pt likely has a bruise. Pt understands and agrees with the plan, all questions answered.    1209- Ordered L hip XR for further evaluation.    1257- Rechecked pt. Pt is resting comfortably. Notified pt and family of the pt's unremarkable imaging results. D/w pt that she could have strained her left SI joint or the muscles around her lower back, especially with her known scoliosis. Discussed the plan to discharge the pt home with instructions to take ibuprofen or Aleve as needed for pain. Pt understands and agrees with the plan, all questions answered.    MEDICAL DECISION MAKING  Results were reviewed/discussed with the patient and they were also made aware of online access. Pt also made aware that follow up with PMD is necessary.     MDM  Number of Diagnoses or Management Options  Fall, initial encounter:   Hip strain, left, initial encounter:      Amount and/or Complexity of Data Reviewed  Tests in the radiology section of CPT®: ordered and reviewed (L hip XR shows chronic  changes to bilateral hips and L-Spine but no acute fracture. )  Obtain history from someone other than the patient: yes (spouse)  Independent visualization of images, tracings, or specimens: yes    Patient Progress  Patient progress: stable         DIAGNOSIS  Final diagnoses:   Hip strain, left, initial encounter   Fall, initial encounter       DISPOSITION  DISCHARGE    Patient discharged in stable condition.    Reviewed implications of results, diagnosis, meds, responsibility to follow up, warning signs and symptoms of possible worsening, potential complications and reasons to return to ER, including fever, worsening pain or any concerns.    Patient/Family voiced understanding of above instructions.    Discussed plan for discharge, as there is no emergent indication for admission. Patient referred to primary care provider for BP management due to today's BP. Pt/family is agreeable and understands need for follow up and repeat testing.  Pt is aware that discharge does not mean that nothing is wrong but it indicates no emergency is present that requires admission and they must continue care with follow-up as given below or physician of their choice.     FOLLOW-UP  Wilmar Triana MD  [address] [id number] [phone]    Call   As needed, If symptoms worsen         Medication List      No changes were made to your prescriptions during this visit.           Latest Documented Vital Signs:  As of 1:02 PM  BP- 160/80 HR- 86 Temp- 97.5 °F (36.4 °C) O2 sat- 100%    --  Documentation assistance provided by jocelin Waller for Dr. Addison.  Information recorded by the scribe was done at my direction and has been verified and validated by me.     Melly Waller  06/17/18 [id number]       Odell Addison MD  06/17/18 [id number]

## 2018-06-20 ENCOUNTER — HOSPITAL ENCOUNTER (OUTPATIENT)
Dept: MRI IMAGING | Facility: HOSPITAL | Age: 68
Discharge: HOME | End: 2018-06-20
Admitting: NURSE PRACTITIONER

## 2018-06-20 DIAGNOSIS — I67.1 BRAIN ANEURYSM: ICD-10-CM

## 2018-06-20 PROCEDURE — 70544 MR ANGIOGRAPHY HEAD W/O DYE: CPT

## 2018-07-18 ENCOUNTER — TRANSCRIBE ORDERS (OUTPATIENT)
Dept: PHYSICAL THERAPY | Facility: CLINIC | Age: 68
End: 2018-07-18

## 2018-07-18 DIAGNOSIS — M25.551 PAIN OF BOTH HIP JOINTS: Primary | ICD-10-CM

## 2018-07-18 DIAGNOSIS — M25.552 PAIN OF BOTH HIP JOINTS: Primary | ICD-10-CM

## 2018-07-24 ENCOUNTER — TREATMENT (OUTPATIENT)
Dept: PHYSICAL THERAPY | Facility: CLINIC | Age: 68
End: 2018-07-24

## 2018-07-24 DIAGNOSIS — M25.551 RIGHT HIP PAIN: Primary | ICD-10-CM

## 2018-07-24 DIAGNOSIS — M25.552 LEFT HIP PAIN: ICD-10-CM

## 2018-07-24 PROCEDURE — 97110 THERAPEUTIC EXERCISES: CPT | Performed by: PHYSICAL THERAPIST

## 2018-07-24 PROCEDURE — 97001 PR PHYS THERAPY EVALUATION: CPT | Performed by: PHYSICAL THERAPIST

## 2018-07-24 NOTE — PROGRESS NOTES
"Physical Therapy Initial Evaluation and Plan of Care        Subjective Evaluation    History of Present Illness  Mechanism of injury: Patient reports that she tripped and fell on rolled up carpet.  She states that she is currently 80% better and has minimal pain and has returned to daily activities.  She comments that the only thing keeping her from being 100% is that she still has a \"knot\" on her right hip.       Patient Occupation:    Precautions and Work Restrictions: no restrictionsQuality of life: good    Pain  Current pain ratin  At best pain ratin  At worst pain ratin  Relieving factors: ice  Exacerbated by: denies functional deficits.     Treatments  Previous treatment: medication  Patient Goals  Patient goals for therapy: decreased edema             Objective       Palpation     Right Tenderness of the iliopsoas.     Tenderness     Right Hip   Tenderness in the greater trochanter.     Additional Tenderness Details  Hematoma on right greater trochanter    Active Range of Motion     Additional Active Range of Motion Details  Hip ROM was in normal limits however has limited ER on left as compared to right.     Strength/Myotome Testing     Right Hip   Planes of Motion   Flexion: 4+  Abduction: 4+  Adduction: 4+  External rotation: 4+  Internal rotation: 4+         Assessment & Plan     Assessment  Impairments: lacks appropriate home exercise program  Assessment details: Patient is a 67 year old female who presents with increased tenderness at right hip greater trochanter from a recent fall.  Upon examination she has full AROM of bilateral hips with the exception of left hip ER.  Good strength bilaterally.   She has history to chronic scoliosis.  Tenderness noted directly over right greater trochanter with hematoma and discoloration.  She states that functionally she is not limited at this time and only has localized tenderness.  Patient was given stretching program for bilateral hips which " she tolerated well and instructed to ice 2-3 times per day.  Modalities are not appropriate due commodities.  Patient states that she will be out of town over the next two weeks.   Prognosis: good  Prognosis details: LTG    1) Independent with HEP    Plan  Therapy options: will be seen for skilled physical therapy services  Planned modality interventions: TENS, ultrasound, thermotherapy (hydrocollator packs) and cryotherapy  Planned therapy interventions: manual therapy, soft tissue mobilization, strengthening, stretching, therapeutic activities, joint mobilization, home exercise program, functional ROM exercises, flexibility and body mechanics training  Treatment plan discussed with: patient          Therapeutic Exercise:    12     mins  [id number];       Timed Treatment:   12   mins   Total Treatment:     30   mins    PT SIGNATURE: Naila Rocha, PT   DATE TREATMENT INITIATED: 7/24/2018    Initial Certification  Certification Period: 10/22/2018  I certify that the therapy services are furnished while this patient is under my care.  The services outlined above are required by this patient, and will be reviewed every 90 days.     PHYSICIAN: Arben Chanel MD      DATE:     Please sign and return via fax to [phone].. Thank you, [facility].

## 2019-06-13 ENCOUNTER — TRANSCRIBE ORDERS (OUTPATIENT)
Dept: ADMINISTRATIVE | Facility: HOSPITAL | Age: 69
End: 2019-06-13

## 2019-06-13 DIAGNOSIS — I67.1 BRAIN ANEURYSM: Primary | ICD-10-CM

## 2019-06-29 ENCOUNTER — HOSPITAL ENCOUNTER (OUTPATIENT)
Dept: MRI IMAGING | Facility: HOSPITAL | Age: 69
Discharge: HOME | End: 2019-06-29
Admitting: NURSE PRACTITIONER

## 2019-06-29 DIAGNOSIS — I67.1 BRAIN ANEURYSM: ICD-10-CM

## 2019-06-29 PROCEDURE — 70544 MR ANGIOGRAPHY HEAD W/O DYE: CPT

## 2020-02-12 ENCOUNTER — LAB (OUTPATIENT)
Dept: LAB | Facility: HOSPITAL | Age: 70
End: 2020-02-12

## 2020-02-12 DIAGNOSIS — C91.10 CHRONIC LYMPHOID LEUKEMIA (HCC): ICD-10-CM

## 2020-02-12 DIAGNOSIS — C91.10 CHRONIC LYMPHOID LEUKEMIA (HCC): Primary | ICD-10-CM

## 2020-02-12 DIAGNOSIS — C91.10 CLL (CHRONIC LYMPHOCYTIC LEUKEMIA) (HCC): Primary | ICD-10-CM

## 2020-02-12 LAB
ALBUMIN SERPL-MCNC: 4.4 G/DL (ref 3.5–5.2)
ALBUMIN/GLOB SERPL: 2.2 G/DL (ref 1.1–2.4)
ALP SERPL-CCNC: 63 U/L (ref 38–116)
ALT SERPL W P-5'-P-CCNC: 18 U/L (ref 0–33)
ANION GAP SERPL CALCULATED.3IONS-SCNC: 11.7 MMOL/L (ref 5–15)
AST SERPL-CCNC: 20 U/L (ref 0–32)
BASOPHILS # BLD AUTO: 0.02 10*3/MM3 (ref 0–0.2)
BASOPHILS NFR BLD AUTO: 0.4 % (ref 0–1.5)
BILIRUB SERPL-MCNC: 0.3 MG/DL (ref 0.2–1.2)
BUN BLD-MCNC: 17 MG/DL (ref 6–20)
BUN/CREAT SERPL: 23 (ref 7.3–30)
CALCIUM SPEC-SCNC: 9.5 MG/DL (ref 8.5–10.2)
CHLORIDE SERPL-SCNC: 103 MMOL/L (ref 98–107)
CO2 SERPL-SCNC: 29.3 MMOL/L (ref 22–29)
CREAT BLD-MCNC: 0.74 MG/DL (ref 0.6–1.1)
DEPRECATED RDW RBC AUTO: 48.3 FL (ref 37–54)
EOSINOPHIL # BLD AUTO: 0.12 10*3/MM3 (ref 0–0.4)
EOSINOPHIL NFR BLD AUTO: 2.2 % (ref 0.3–6.2)
ERYTHROCYTE [DISTWIDTH] IN BLOOD BY AUTOMATED COUNT: 13.5 % (ref 12.3–15.4)
GFR SERPL CREATININE-BSD FRML MDRD: 78 ML/MIN/1.73
GLOBULIN UR ELPH-MCNC: 2 GM/DL (ref 1.8–3.5)
GLUCOSE BLD-MCNC: 79 MG/DL (ref 74–124)
HCT VFR BLD AUTO: 42.8 % (ref 34–46.6)
HGB BLD-MCNC: 13.9 G/DL (ref 12–15.9)
IMM GRANULOCYTES # BLD AUTO: 0.02 10*3/MM3 (ref 0–0.05)
IMM GRANULOCYTES NFR BLD AUTO: 0.4 % (ref 0–0.5)
LYMPHOCYTES # BLD AUTO: 0.77 10*3/MM3 (ref 0.7–3.1)
LYMPHOCYTES NFR BLD AUTO: 13.9 % (ref 19.6–45.3)
MCH RBC QN AUTO: 31.8 PG (ref 26.6–33)
MCHC RBC AUTO-ENTMCNC: 32.5 G/DL (ref 31.5–35.7)
MCV RBC AUTO: 97.9 FL (ref 79–97)
MONOCYTES # BLD AUTO: 0.48 10*3/MM3 (ref 0.1–0.9)
MONOCYTES NFR BLD AUTO: 8.7 % (ref 5–12)
NEUTROPHILS # BLD AUTO: 4.13 10*3/MM3 (ref 1.7–7)
NEUTROPHILS NFR BLD AUTO: 74.4 % (ref 42.7–76)
NRBC BLD AUTO-RTO: 0 /100 WBC (ref 0–0.2)
PLATELET # BLD AUTO: 197 10*3/MM3 (ref 140–450)
PMV BLD AUTO: 10.9 FL (ref 6–12)
POTASSIUM BLD-SCNC: 3.8 MMOL/L (ref 3.5–4.7)
PROT SERPL-MCNC: 6.4 G/DL (ref 6.3–8)
RBC # BLD AUTO: 4.37 10*6/MM3 (ref 3.77–5.28)
SODIUM BLD-SCNC: 144 MMOL/L (ref 134–145)
WBC NRBC COR # BLD: 5.54 10*3/MM3 (ref 3.4–10.8)

## 2020-02-12 PROCEDURE — 80053 COMPREHEN METABOLIC PANEL: CPT

## 2020-02-12 PROCEDURE — 85025 COMPLETE CBC W/AUTO DIFF WBC: CPT

## 2020-02-12 PROCEDURE — 36415 COLL VENOUS BLD VENIPUNCTURE: CPT

## 2020-04-17 ENCOUNTER — TELEPHONE (OUTPATIENT)
Dept: ONCOLOGY | Facility: CLINIC | Age: 70
End: 2020-04-17

## 2020-04-17 DIAGNOSIS — C91.10 CHRONIC LYMPHOID LEUKEMIA (HCC): Primary | ICD-10-CM

## 2020-04-17 NOTE — TELEPHONE ENCOUNTER
Patient is in clinical trial and needs labwork appointment.  [facility] is going to be faxing an order.   Please call her to schedule appointment.  Wants it on 5/1.    [phone]

## 2020-05-01 ENCOUNTER — LAB (OUTPATIENT)
Dept: LAB | Facility: HOSPITAL | Age: 70
End: 2020-05-01

## 2020-05-01 DIAGNOSIS — I67.1 CEREBRAL ANEURYSM WITHOUT RUPTURE: ICD-10-CM

## 2020-05-01 DIAGNOSIS — C91.10 CHRONIC LYMPHOID LEUKEMIA (HCC): Primary | ICD-10-CM

## 2020-05-01 LAB
ALBUMIN SERPL-MCNC: 4.3 G/DL (ref 3.5–5.2)
ALBUMIN/GLOB SERPL: 1.9 G/DL (ref 1.1–2.4)
ALP SERPL-CCNC: 66 U/L (ref 38–116)
ALT SERPL W P-5'-P-CCNC: 16 U/L (ref 0–33)
ANION GAP SERPL CALCULATED.3IONS-SCNC: 13.2 MMOL/L (ref 5–15)
AST SERPL-CCNC: 20 U/L (ref 0–32)
BASOPHILS # BLD AUTO: 0.05 10*3/MM3 (ref 0–0.2)
BASOPHILS NFR BLD AUTO: 0.6 % (ref 0–1.5)
BILIRUB CONJ SERPL-MCNC: <0.2 MG/DL (ref 0–0.2)
BILIRUB SERPL-MCNC: 0.3 MG/DL (ref 0.2–1.2)
BILIRUB UR QL STRIP: NEGATIVE
BUN BLD-MCNC: 20 MG/DL (ref 6–20)
BUN/CREAT SERPL: 20.8 (ref 7.3–30)
CALCIUM SPEC-SCNC: 10 MG/DL (ref 8.5–10.2)
CHLORIDE SERPL-SCNC: 100 MMOL/L (ref 98–107)
CLARITY UR: CLEAR
CO2 SERPL-SCNC: 28.8 MMOL/L (ref 22–29)
COLOR UR: YELLOW
CREAT BLD-MCNC: 0.96 MG/DL (ref 0.6–1.1)
DEPRECATED RDW RBC AUTO: 46.4 FL (ref 37–54)
EOSINOPHIL # BLD AUTO: 0.09 10*3/MM3 (ref 0–0.4)
EOSINOPHIL NFR BLD AUTO: 1 % (ref 0.3–6.2)
ERYTHROCYTE [DISTWIDTH] IN BLOOD BY AUTOMATED COUNT: 12.8 % (ref 12.3–15.4)
GFR SERPL CREATININE-BSD FRML MDRD: 58 ML/MIN/1.73
GLOBULIN UR ELPH-MCNC: 2.3 GM/DL (ref 1.8–3.5)
GLUCOSE BLD-MCNC: 91 MG/DL (ref 74–124)
GLUCOSE UR STRIP-MCNC: NEGATIVE MG/DL
HCT VFR BLD AUTO: 42.6 % (ref 34–46.6)
HGB BLD-MCNC: 13.8 G/DL (ref 12–15.9)
HGB UR QL STRIP.AUTO: NEGATIVE
IMM GRANULOCYTES # BLD AUTO: 0.03 10*3/MM3 (ref 0–0.05)
IMM GRANULOCYTES NFR BLD AUTO: 0.3 % (ref 0–0.5)
KETONES UR QL STRIP: NEGATIVE
LDH SERPL-CCNC: 191 U/L (ref 99–259)
LEUKOCYTE ESTERASE UR QL STRIP.AUTO: NEGATIVE
LYMPHOCYTES # BLD AUTO: 0.9 10*3/MM3 (ref 0.7–3.1)
LYMPHOCYTES NFR BLD AUTO: 10.5 % (ref 19.6–45.3)
MAGNESIUM SERPL-MCNC: 2.6 MG/DL (ref 1.8–2.5)
MCH RBC QN AUTO: 32 PG (ref 26.6–33)
MCHC RBC AUTO-ENTMCNC: 32.4 G/DL (ref 31.5–35.7)
MCV RBC AUTO: 98.8 FL (ref 79–97)
MONOCYTES # BLD AUTO: 0.57 10*3/MM3 (ref 0.1–0.9)
MONOCYTES NFR BLD AUTO: 6.6 % (ref 5–12)
NEUTROPHILS # BLD AUTO: 6.97 10*3/MM3 (ref 1.7–7)
NEUTROPHILS NFR BLD AUTO: 81 % (ref 42.7–76)
NITRITE UR QL STRIP: NEGATIVE
NRBC BLD AUTO-RTO: 0 /100 WBC (ref 0–0.2)
PH UR STRIP.AUTO: 6 [PH] (ref 4.5–8)
PHOSPHATE SERPL-MCNC: 4.8 MG/DL (ref 2.5–4.5)
PLATELET # BLD AUTO: 209 10*3/MM3 (ref 140–450)
PMV BLD AUTO: 10.6 FL (ref 6–12)
POTASSIUM BLD-SCNC: 5 MMOL/L (ref 3.5–4.7)
PROT SERPL-MCNC: 6.6 G/DL (ref 6.3–8)
PROT UR QL STRIP: NEGATIVE
RBC # BLD AUTO: 4.31 10*6/MM3 (ref 3.77–5.28)
SODIUM BLD-SCNC: 142 MMOL/L (ref 134–145)
SP GR UR STRIP: 1.01 (ref 1–1.03)
UROBILINOGEN UR QL STRIP: NORMAL
WBC NRBC COR # BLD: 8.61 10*3/MM3 (ref 3.4–10.8)

## 2020-05-01 PROCEDURE — 81003 URINALYSIS AUTO W/O SCOPE: CPT

## 2020-05-01 PROCEDURE — 84100 ASSAY OF PHOSPHORUS: CPT

## 2020-05-01 PROCEDURE — 83615 LACTATE (LD) (LDH) ENZYME: CPT

## 2020-05-01 PROCEDURE — 80053 COMPREHEN METABOLIC PANEL: CPT

## 2020-05-01 PROCEDURE — 85025 COMPLETE CBC W/AUTO DIFF WBC: CPT

## 2020-05-01 PROCEDURE — 83735 ASSAY OF MAGNESIUM: CPT

## 2020-05-01 PROCEDURE — 36415 COLL VENOUS BLD VENIPUNCTURE: CPT

## 2020-05-01 PROCEDURE — 82248 BILIRUBIN DIRECT: CPT

## 2020-05-05 ENCOUNTER — TELEPHONE (OUTPATIENT)
Dept: ONCOLOGY | Facility: CLINIC | Age: 70
End: 2020-05-05

## 2020-05-05 NOTE — TELEPHONE ENCOUNTER
[facility]'S LABS FROM 5/1/20 BE FAXED TO DR GUILLEN     FAX # [phone]

## 2020-09-14 ENCOUNTER — TRANSCRIBE ORDERS (OUTPATIENT)
Dept: ADMINISTRATIVE | Facility: HOSPITAL | Age: 70
End: 2020-09-14

## 2020-09-14 DIAGNOSIS — I72.9 ANEURYSM (HCC): Primary | ICD-10-CM

## 2020-09-29 ENCOUNTER — HOSPITAL ENCOUNTER (OUTPATIENT)
Dept: MRI IMAGING | Facility: HOSPITAL | Age: 70
Discharge: HOME | End: 2020-09-29
Admitting: NURSE PRACTITIONER

## 2020-09-29 DIAGNOSIS — I72.9 ANEURYSM (HCC): ICD-10-CM

## 2020-09-29 PROCEDURE — 70544 MR ANGIOGRAPHY HEAD W/O DYE: CPT

## 2020-10-22 ENCOUNTER — OFFICE VISIT (OUTPATIENT)
Dept: CARDIOLOGY | Facility: CLINIC | Age: 70
End: 2020-10-22

## 2020-10-22 VITALS
HEIGHT: 65 IN | SYSTOLIC BLOOD PRESSURE: 130 MMHG | BODY MASS INDEX: 17.66 KG/M2 | HEART RATE: 68 BPM | DIASTOLIC BLOOD PRESSURE: 80 MMHG | WEIGHT: 106 LBS

## 2020-10-22 DIAGNOSIS — R94.31 ABNORMAL EKG: ICD-10-CM

## 2020-10-22 DIAGNOSIS — I10 ESSENTIAL HYPERTENSION: Primary | ICD-10-CM

## 2020-10-22 PROCEDURE — 93000 ELECTROCARDIOGRAM COMPLETE: CPT | Performed by: INTERNAL MEDICINE

## 2020-10-22 PROCEDURE — 99214 OFFICE O/P EST MOD 30 MIN: CPT | Performed by: INTERNAL MEDICINE

## 2020-10-22 RX ORDER — LISINOPRIL 10 MG/1
10 TABLET ORAL DAILY
Qty: 30 TABLET | Refills: 6 | Status: SHIPPED | OUTPATIENT
Start: 2020-10-22 | End: 2020-11-23 | Stop reason: SDUPTHER

## 2020-10-22 NOTE — PROGRESS NOTES
Abigail Rizzo  1950  Date of Office Visit: 10/22/20  Encounter Provider: Lance Marie MD  Place of Service: [facility] CARDIOLOGY      CHIEF COMPLAINT:  Essential hypertension  Abnormal electrocardiogram    HISTORY OF PRESENT ILLNESS: Very pleasant 70-year-old female with a medical history of essential hypertension, CLL, and cerebral aneurysm who follows at [facility] as well.  She comes to see me secondary to elevated blood pressure.  She is concerned that her blood pressure is elevated and it does have some measurements that are above 140 mmHg systolic.  Much of the time her systolic is 130-140 mmHg. She denies any chest pain or dyspnea on exertion.  She is active and continues to work out 3-4 times a week.  She does have an electrocardiogram with some subtle ST flattening to mild depression in the inferolateral leads.  She has no other significant complaint at this point in time.            Review of Systems   Constitution: Negative for fever and malaise/fatigue.   HENT: Negative for nosebleeds and sore throat.    Eyes: Negative for blurred vision and double vision.   Cardiovascular: Negative for chest pain, claudication, palpitations and syncope.   Respiratory: Negative for cough, shortness of breath and snoring.    Endocrine: Negative for cold intolerance, heat intolerance and polydipsia.   Skin: Negative for itching, poor wound healing and rash.   Musculoskeletal: Negative for joint pain, joint swelling, muscle weakness and myalgias.   Gastrointestinal: Negative for abdominal pain, melena, nausea and vomiting.   Neurological: Negative for light-headedness, loss of balance, seizures, vertigo and weakness.   Psychiatric/Behavioral: Negative for altered mental status and depression.       Past Medical History:   Diagnosis Date   • Cerebral aneurysm    • CLL (chronic lymphocytic leukemia) (CMS/HCC)     /SLL   • H/O Broken shoulder     S/P seizure while on Asendin.   • H/O  Foot deformities     Various foot deformities corrected from 1979   • H/O Ovarian cysts    • Hypertension    • Injury of back    • Scoliosis     Gives back pain       The following portions of the patient's history were reviewed and updated as appropriate: Social history , Family history and Surgical history     Current Outpatient Medications on File Prior to Visit   Medication Sig Dispense Refill   • acalabrutinib (CALQUENCE) 100 MG capsule capsule Take 100 mg by mouth Daily.     • acyclovir (ZOVIRAX) 800 MG tablet Take 800 mg by mouth Daily.     • amLODIPine (NORVASC) 5 MG tablet TK 1 T PO  DAILY  6   • amoxicillin (AMOXIL) 500 MG capsule Take 500 mg by mouth. Prior to any dental procedure     • Biotin 1000 MCG tablet Take 2,000 mcg by mouth Daily.     • calcium carbonate (OS-TYRON) 600 MG tablet Take 600 mg by mouth Daily.     • Multiple Vitamins-Minerals (MULTIVITAMIN ADULT PO) Take  by mouth Daily.     • Psyllium (METAMUCIL) 28.3 % powder Take  by mouth Daily.     • vitamin B-12 (CYANOCOBALAMIN) 100 MCG tablet Take 50 mcg by mouth Daily.     • vitamin C (ASCORBIC ACID) 500 MG tablet Take 500 mg by mouth Daily.     • [DISCONTINUED] lisinopril (PRINIVIL,ZESTRIL) 5 MG tablet TK 1 T PO D  3   • [DISCONTINUED] allopurinol (ZYLOPRIM) 300 MG tablet Take 300 mg by mouth Daily.  1   • [DISCONTINUED] RETIN-A MICRO PUMP 0.08 % gel APPLY TO AFFECTED AREA Q NIGHT  HS  5   • [DISCONTINUED] RiTUXimab (RITUXAN IV) Infuse  into a venous catheter.     • [DISCONTINUED] tiZANidine (ZANAFLEX) 2 MG tablet Take 2 mg by mouth at night as needed for muscle spasms (3 per day).     • [DISCONTINUED] traMADol (ULTRAM) 50 MG tablet Take 50 mg by mouth as needed for moderate pain (4-6) (4 per day).     • [DISCONTINUED] Venetoclax 100 MG tablet Take 400 mg by mouth Daily.       No current facility-administered medications on file prior to visit.        Allergies   Allergen Reactions   • Amoxapine Other (See Comments)     Passed  "out  01/23/2006-ASENDIN seizure  Passed out     • Diphenhydramine Hcl Other (See Comments)     Redness,  Pt prefers not to take       Vitals:    10/22/20 1454   BP: 130/80   Pulse: 68   Weight: 48.1 kg (106 lb)   Height: 163.8 cm (64.5\")     Constitutional:       Appearance: Well-developed.   Eyes:      General: No scleral icterus.     Conjunctiva/sclera: Conjunctivae normal.   HENT:      Head: Normocephalic and atraumatic.   Neck:      Musculoskeletal: Normal range of motion and neck supple.      Thyroid: No thyromegaly.      Vascular: Normal carotid pulses. No carotid bruit, hepatojugular reflux or JVD.      Trachea: No tracheal deviation.   Pulmonary:      Effort: No respiratory distress.      Breath sounds: Normal breath sounds. No decreased breath sounds. No wheezing. No rhonchi. No rales.   Chest:      Chest wall: Not tender to palpatation.   Cardiovascular:      Normal rate. Regular rhythm.      No gallop.   Pulses:     Carotid: 2+ bilaterally.     Radial: 2+ bilaterally.     Femoral: 2+ bilaterally.     Dorsalis pedis: 2+ bilaterally.     Posterior tibial: 2+ bilaterally.  Edema:     Peripheral edema absent.   Abdominal:      General: Bowel sounds are normal. There is no distension.      Palpations: Abdomen is soft.      Tenderness: There is no abdominal tenderness. There is no rebound.   Musculoskeletal:         General: No deformity.   Skin:     Findings: No erythema or rash.   Neurological:      Mental Status: Alert and oriented to person, place, and time.      Sensory: No sensory deficit.   Psychiatric:         Behavior: Behavior normal.         Lab Results   Component Value Date    WBC 8.61 05/01/2020    HGB 13.8 05/01/2020    HCT 42.6 05/01/2020    MCV 98.8 (H) 05/01/2020     05/01/2020       Lab Results   Component Value Date    GLUCOSE 91 05/01/2020    BUN 20 05/01/2020    CREATININE 0.96 05/01/2020    EGFRIFNONA 58 (L) 05/01/2020    BCR 20.8 05/01/2020    K 5.0 (H) 05/01/2020    CO2 28.8 " 05/01/2020    CALCIUM 10.0 05/01/2020    ALBUMIN 3.9 10/21/2020    AST 16 10/21/2020    ALT 13 10/21/2020       Lab Results   Component Value Date    GLUCOSE 91 05/01/2020    CALCIUM 10.0 05/01/2020     05/01/2020    K 5.0 (H) 05/01/2020    CO2 28.8 05/01/2020     05/01/2020    BUN 20 05/01/2020    CREATININE 0.96 05/01/2020    EGFRIFNONA 58 (L) 05/01/2020    BCR 20.8 05/01/2020    ANIONGAP 13.2 05/01/2020       No results found for: CHOL, CHLPL, TRIG, HDL, LDL, LDLDIRECT      ECG 12 Lead    Date/Time: 10/22/2020 3:23 PM  Performed by: Lance Marie MD  Authorized by: Lance Marie MD   Comparison: not compared with previous ECG   Rhythm: sinus rhythm  Rate: normal  QRS axis: normal    Clinical impression: non-specific ECG  Comments: Nonspecific repolarization abnormality inferior lateral leads.                  DISCUSSION/SUMMARYThis is a very pleasant 70-year-old female with a medical history of CLL, cerebral aneurysm, scoliosis and essential hypertension who presents to me for evaluation of her hypertension.  Her blood pressure is just mildly elevated on her current regimen, which includes amlodipine 5 mg daily along with lisinopril at 5 mg as well.  She denies any concerning symptoms at this time.    Her EKG does have subtle repolarization abnormalities and I do think an echocardiogram is reasonable.    1.  Essential hypertension.  - Plan on getting her set up with an increased dose of lisinopril at 10 mg p.o. daily.  This has been called in.    - Continue amlodipine at current dose.    - Echocardiogram will be ordered secondary to slight repolarization abnormalities in the setting of essential hypertension.   I do not think she needs an ischemic workup.

## 2020-10-23 ENCOUNTER — TELEPHONE (OUTPATIENT)
Dept: CARDIOLOGY | Facility: CLINIC | Age: 70
End: 2020-10-23

## 2020-10-23 NOTE — TELEPHONE ENCOUNTER
----- Message from Abigail Rizzo sent at 10/22/2020  6:31 PM EDT -----  Regarding: Non-Urgent Medical Question  Contact: [phone]  Hello,    Could you please call me so that we can schedule the ultrasound test that Dr. Marie wants?  Please call me at [phone].    Thanks,  Abigail

## 2020-11-23 ENCOUNTER — OFFICE VISIT (OUTPATIENT)
Dept: CARDIOLOGY | Facility: CLINIC | Age: 70
End: 2020-11-23

## 2020-11-23 ENCOUNTER — HOSPITAL ENCOUNTER (OUTPATIENT)
Dept: CARDIOLOGY | Facility: HOSPITAL | Age: 70
Discharge: HOME | End: 2020-11-23
Admitting: INTERNAL MEDICINE

## 2020-11-23 VITALS
SYSTOLIC BLOOD PRESSURE: 130 MMHG | HEIGHT: 64 IN | HEART RATE: 81 BPM | WEIGHT: 106 LBS | BODY MASS INDEX: 18.1 KG/M2 | DIASTOLIC BLOOD PRESSURE: 76 MMHG

## 2020-11-23 VITALS
HEART RATE: 72 BPM | WEIGHT: 107.8 LBS | DIASTOLIC BLOOD PRESSURE: 90 MMHG | SYSTOLIC BLOOD PRESSURE: 160 MMHG | BODY MASS INDEX: 17.96 KG/M2 | OXYGEN SATURATION: 98 % | HEIGHT: 65 IN

## 2020-11-23 DIAGNOSIS — I10 ESSENTIAL HYPERTENSION: Primary | ICD-10-CM

## 2020-11-23 DIAGNOSIS — I10 ESSENTIAL HYPERTENSION: ICD-10-CM

## 2020-11-23 DIAGNOSIS — R94.31 ABNORMAL EKG: ICD-10-CM

## 2020-11-23 PROCEDURE — 93000 ELECTROCARDIOGRAM COMPLETE: CPT | Performed by: NURSE PRACTITIONER

## 2020-11-23 PROCEDURE — 93306 TTE W/DOPPLER COMPLETE: CPT

## 2020-11-23 PROCEDURE — 99213 OFFICE O/P EST LOW 20 MIN: CPT | Performed by: NURSE PRACTITIONER

## 2020-11-23 PROCEDURE — 93306 TTE W/DOPPLER COMPLETE: CPT | Performed by: INTERNAL MEDICINE

## 2020-11-23 RX ORDER — AMLODIPINE BESYLATE 5 MG/1
5 TABLET ORAL DAILY
Qty: 90 TABLET | Refills: 3 | Status: SHIPPED | OUTPATIENT
Start: 2020-11-23 | End: 2021-11-05

## 2020-11-23 RX ORDER — LISINOPRIL 20 MG/1
20 TABLET ORAL DAILY
Qty: 90 TABLET | Refills: 3 | Status: SHIPPED | OUTPATIENT
Start: 2020-11-23 | End: 2021-06-28 | Stop reason: ALTCHOICE

## 2020-11-23 NOTE — PROGRESS NOTES
Date of Office Visit: 2020  Encounter Provider: MAGGI Lucero  Place of Service: [facility] CARDIOLOGY  Patient Name: Abigail Rizzo  :1950    Chief Complaint   Patient presents with   • Hypertension   :     HPI: Abigail Rizzo is a 70 y.o. female, new to me, who presents today for follow-up.  Old records have been obtained and reviewed by me.  She is a patient with a past medical history significant for hypertension, CLL, and cerebral aneurysm.  She was first evaluated by Dr. Marie on 10/22/2020 for elevated blood pressure.  She had slight repolarization abnormalities on her EKG, and Dr. Marie ordered an echocardiogram.  Her lisinopril was increased to 20 mg daily.  She is here today for follow-up and echocardiogram.   Overall she has been doing well.  She denies any chest pain, shortness of breath, palpitations, edema, dizziness, or syncope.  She was really nervous today while getting her blood pressure checked.  At home, her systolic blood pressure has mostly been in the 120s to 130s.  In the evening it has actually been running in the 100s and 110s.  Her diastolic has been in the 70s and 80s.  She has been checking her blood pressure obsessively and has been concerned about it being high.  She exercises 45 minutes every day.  Evidently she had to quit her job because of the pandemic and her CLL.    Past Medical History:   Diagnosis Date   • Cerebral aneurysm    • CLL (chronic lymphocytic leukemia) (CMS/[facility])     /SLL   • H/O Broken shoulder     S/P seizure while on Asendin.   • H/O Foot deformities     Various foot deformities corrected from    • H/O Ovarian cysts    • Hypertension    • Injury of back    • Scoliosis     Gives back pain       Past Surgical History:   Procedure Laterality Date   • FOOT SURGERY       to  various foot deformities corrected   • NASAL SEPTUM SURGERY     • OOPHORECTOMY Left     and part of right   • SHOULDER SURGERY Left  1989    Replacement of the left shoulder in 1989 in [place], [place] because of osteoarthitis, trauma, and pain   • SHOULDER SURGERY Left 2005    Shoulder revision       Social History     Socioeconomic History   • Marital status:      Spouse name: Denilson   • Number of children: 0   • Years of education: College   • Highest education level: Not on file   Occupational History   • Occupation:      Employer: [employer]     Comment: [employer]   Tobacco Use   • Smoking status: Never Smoker   • Smokeless tobacco: Never Used   Substance and Sexual Activity   • Alcohol use: Yes     Alcohol/week: 0.0 - 1.0 standard drinks     Comment: Occasional   • Drug use: No   • Sexual activity: Defer       Family History   Problem Relation Age of Onset   • Leukemia Mother 85        CLL   • Allergy (severe) Mother    • Hypertension Mother    • Skin cancer Mother 16   • Thrombosis Father         Cerebral thrombosis   • Bleeding Disorder Sister    • Cervical cancer Sister 49       Review of Systems   Constitution: Negative for chills, fever and malaise/fatigue.   Cardiovascular: Negative for chest pain, dyspnea on exertion, leg swelling, near-syncope, orthopnea, palpitations, paroxysmal nocturnal dyspnea and syncope.   Respiratory: Negative for cough and shortness of breath.    Musculoskeletal: Negative for joint pain, joint swelling and myalgias.   Gastrointestinal: Negative for abdominal pain, diarrhea, melena, nausea and vomiting.   Genitourinary: Negative for frequency and hematuria.   Neurological: Negative for light-headedness, numbness, paresthesias and seizures.   Allergic/Immunologic: Negative.    All other systems reviewed and are negative.      Allergies   Allergen Reactions   • Amoxapine Other (See Comments)     Passed out  01/23/2006-ASENDIN seizure  Passed out     • Diphenhydramine Hcl Other (See Comments)     Redness,  Pt prefers not to take         Current Outpatient  "Medications:   •  acalabrutinib (CALQUENCE) 100 MG capsule capsule, Take 100 mg by mouth Daily., Disp: , Rfl:   •  acyclovir (ZOVIRAX) 800 MG tablet, Take 800 mg by mouth Daily., Disp: , Rfl:   •  amLODIPine (NORVASC) 5 MG tablet, TK 1 T PO  DAILY, Disp: , Rfl: 6  •  amoxicillin (AMOXIL) 500 MG capsule, Take 500 mg by mouth. Prior to any dental procedure, Disp: , Rfl:   •  Biotin 1000 MCG tablet, Take 2,000 mcg by mouth Daily., Disp: , Rfl:   •  calcium carbonate (OS-TYRON) 600 MG tablet, Take 600 mg by mouth Daily., Disp: , Rfl:   •  lisinopril (PRINIVIL,ZESTRIL) 10 MG tablet, Take 1 tablet by mouth Daily. (Patient taking differently: Take 20 mg by mouth Daily.), Disp: 30 tablet, Rfl: 6  •  Multiple Vitamins-Minerals (MULTIVITAMIN ADULT PO), Take  by mouth Daily., Disp: , Rfl:   •  Psyllium (METAMUCIL) 28.3 % powder, Take  by mouth Daily., Disp: , Rfl:   •  vitamin B-12 (CYANOCOBALAMIN) 100 MCG tablet, Take 50 mcg by mouth Daily., Disp: , Rfl:   •  vitamin C (ASCORBIC ACID) 500 MG tablet, Take 500 mg by mouth Daily., Disp: , Rfl:       Objective:     Vitals:    11/23/20 0956 11/23/20 0958   BP: 150/100 160/90   BP Location: Left arm Right arm   Patient Position: Sitting Sitting   Cuff Size: Adult Adult   Pulse: 72    SpO2: 98%    Weight: 48.9 kg (107 lb 12.8 oz)    Height: 163.8 cm (64.5\")      Body mass index is 18.22 kg/m².    PHYSICAL EXAM:    Constitutional:       General: Not in acute distress.     Appearance: Well-developed. Not diaphoretic.   Eyes:      Pupils: Pupils are equal, round, and reactive to light.   HENT:      Head: Normocephalic and atraumatic.   Neck:      Thyroid: No thyromegaly.      Vascular: No JVD.   Pulmonary:      Effort: Pulmonary effort is normal. No respiratory distress.      Breath sounds: Normal breath sounds.   Cardiovascular:      Normal rate. Regular rhythm.   Pulses:     Intact distal pulses.   Abdominal:      General: Bowel sounds are normal. There is no distension.      " Palpations: Abdomen is soft. There is no hepatomegaly or splenomegaly.      Tenderness: There is no abdominal tenderness.   Musculoskeletal: Normal range of motion.   Skin:     General: Skin is warm and dry.      Findings: No erythema.   Neurological:      Mental Status: Alert and oriented to person, place, and time.   Psychiatric:         Behavior: Behavior normal.         Judgment: Judgment normal.           ECG 12 Lead    Date/Time: 11/23/2020 10:06 AM  Performed by: Armida Jurado APRN  Authorized by: Armida Jurado APRN   Comparison: compared with previous ECG from 10/22/2020  Similar to previous ECG  Rhythm: sinus rhythm  Rate: normal  BPM: 67    Clinical impression: normal ECG  Comments: Indication: Hypertension              Assessment:       Diagnosis Plan   1. Essential hypertension  ECG 12 Lead     Orders Placed This Encounter   Procedures   • ECG 12 Lead     This order was created via procedure documentation          Plan:       I think she is stable and doing well.  She denies any symptoms of angina or heart failure.  I think her blood pressure is well controlled.  I think she has unrealistic expectations of what her blood pressure should be.  We discussed that it is very normal blood pressure to be higher as we age, and that her blood pressure goal is less than 140/90.  I do not yet have the final read on her echocardiogram, so we will call her once that becomes available.  I am not going to make any changes today, and she will follow-up with Dr. Marie in 6 months or sooner if needed.      As always, it has been a pleasure to participate in your patient's care.      Sincerely,         MAGGI Vines

## 2020-11-24 LAB
AORTIC ARCH: 2.2 CM
ASCENDING AORTA: 2.8 CM
BH CV ECHO MEAS - ACS: 1.8 CM
BH CV ECHO MEAS - AO MAX PG (FULL): 6.5 MMHG
BH CV ECHO MEAS - AO MAX PG: 10.2 MMHG
BH CV ECHO MEAS - AO MEAN PG (FULL): 2.9 MMHG
BH CV ECHO MEAS - AO MEAN PG: 4.9 MMHG
BH CV ECHO MEAS - AO ROOT AREA (BSA CORRECTED): 2.2
BH CV ECHO MEAS - AO ROOT AREA: 8.7 CM^2
BH CV ECHO MEAS - AO ROOT DIAM: 3.3 CM
BH CV ECHO MEAS - AO V2 MAX: 159.6 CM/SEC
BH CV ECHO MEAS - AO V2 MEAN: 105 CM/SEC
BH CV ECHO MEAS - AO V2 VTI: 34.4 CM
BH CV ECHO MEAS - ASC AORTA: 2.8 CM
BH CV ECHO MEAS - AVA(I,A): 1.3 CM^2
BH CV ECHO MEAS - AVA(I,D): 1.3 CM^2
BH CV ECHO MEAS - AVA(V,A): 1.3 CM^2
BH CV ECHO MEAS - AVA(V,D): 1.3 CM^2
BH CV ECHO MEAS - BSA(HAYCOCK): 1.5 M^2
BH CV ECHO MEAS - BSA: 1.5 M^2
BH CV ECHO MEAS - BZI_BMI: 18.2 KILOGRAMS/M^2
BH CV ECHO MEAS - BZI_METRIC_HEIGHT: 162.6 CM
BH CV ECHO MEAS - BZI_METRIC_WEIGHT: 48.1 KG
BH CV ECHO MEAS - EDV(MOD-SP2): 50 ML
BH CV ECHO MEAS - EDV(MOD-SP4): 50 ML
BH CV ECHO MEAS - EDV(TEICH): 76.5 ML
BH CV ECHO MEAS - EF(CUBED): 85.9 %
BH CV ECHO MEAS - EF(MOD-BP): 68.1 %
BH CV ECHO MEAS - EF(MOD-SP2): 66 %
BH CV ECHO MEAS - EF(MOD-SP4): 68 %
BH CV ECHO MEAS - EF(TEICH): 79.7 %
BH CV ECHO MEAS - ESV(MOD-SP2): 17 ML
BH CV ECHO MEAS - ESV(MOD-SP4): 16 ML
BH CV ECHO MEAS - ESV(TEICH): 15.5 ML
BH CV ECHO MEAS - FS: 47.9 %
BH CV ECHO MEAS - IVS/LVPW: 0.9
BH CV ECHO MEAS - IVSD: 0.89 CM
BH CV ECHO MEAS - LAT PEAK E' VEL: 10.1 CM/SEC
BH CV ECHO MEAS - LV DIASTOLIC VOL/BSA (35-75): 33.5 ML/M^2
BH CV ECHO MEAS - LV MASS(C)D: 124.4 GRAMS
BH CV ECHO MEAS - LV MASS(C)DI: 83.3 GRAMS/M^2
BH CV ECHO MEAS - LV MAX PG: 3.7 MMHG
BH CV ECHO MEAS - LV MEAN PG: 2 MMHG
BH CV ECHO MEAS - LV SYSTOLIC VOL/BSA (12-30): 10.7 ML/M^2
BH CV ECHO MEAS - LV V1 MAX: 96.4 CM/SEC
BH CV ECHO MEAS - LV V1 MEAN: 66.6 CM/SEC
BH CV ECHO MEAS - LV V1 VTI: 21.1 CM
BH CV ECHO MEAS - LVIDD: 4.2 CM
BH CV ECHO MEAS - LVIDS: 2.2 CM
BH CV ECHO MEAS - LVLD AP2: 5.8 CM
BH CV ECHO MEAS - LVLD AP4: 6.1 CM
BH CV ECHO MEAS - LVLS AP2: 5.5 CM
BH CV ECHO MEAS - LVLS AP4: 5.7 CM
BH CV ECHO MEAS - LVOT AREA (M): 2.3 CM^2
BH CV ECHO MEAS - LVOT AREA: 2.1 CM^2
BH CV ECHO MEAS - LVOT DIAM: 1.7 CM
BH CV ECHO MEAS - LVPWD: 1 CM
BH CV ECHO MEAS - MED PEAK E' VEL: 8.1 CM/SEC
BH CV ECHO MEAS - MR MAX PG: 130.5 MMHG
BH CV ECHO MEAS - MR MAX VEL: 571.2 CM/SEC
BH CV ECHO MEAS - MV A DUR: 0.13 SEC
BH CV ECHO MEAS - MV A MAX VEL: 79.7 CM/SEC
BH CV ECHO MEAS - MV DEC SLOPE: 541.6 CM/SEC^2
BH CV ECHO MEAS - MV DEC TIME: 0.24 SEC
BH CV ECHO MEAS - MV E MAX VEL: 85.7 CM/SEC
BH CV ECHO MEAS - MV E/A: 1.1
BH CV ECHO MEAS - MV MAX PG: 5.9 MMHG
BH CV ECHO MEAS - MV MEAN PG: 2.6 MMHG
BH CV ECHO MEAS - MV P1/2T MAX VEL: 118.4 CM/SEC
BH CV ECHO MEAS - MV P1/2T: 64 MSEC
BH CV ECHO MEAS - MV V2 MAX: 121.6 CM/SEC
BH CV ECHO MEAS - MV V2 MEAN: 76.1 CM/SEC
BH CV ECHO MEAS - MV V2 VTI: 33.2 CM
BH CV ECHO MEAS - MVA P1/2T LCG: 1.9 CM^2
BH CV ECHO MEAS - MVA(P1/2T): 3.4 CM^2
BH CV ECHO MEAS - MVA(VTI): 1.4 CM^2
BH CV ECHO MEAS - PA ACC TIME: 0.11 SEC
BH CV ECHO MEAS - PA MAX PG (FULL): 0.28 MMHG
BH CV ECHO MEAS - PA MAX PG: 2.8 MMHG
BH CV ECHO MEAS - PA PR(ACCEL): 31.5 MMHG
BH CV ECHO MEAS - PA V2 MAX: 84.2 CM/SEC
BH CV ECHO MEAS - PULM A REVS DUR: 0.12 SEC
BH CV ECHO MEAS - PULM A REVS VEL: 33 CM/SEC
BH CV ECHO MEAS - PULM DIAS VEL: 47.6 CM/SEC
BH CV ECHO MEAS - PULM S/D: 1.3
BH CV ECHO MEAS - PULM SYS VEL: 62.6 CM/SEC
BH CV ECHO MEAS - PVA(V,A): 2.2 CM^2
BH CV ECHO MEAS - PVA(V,D): 2.2 CM^2
BH CV ECHO MEAS - QP/QS: 0.87
BH CV ECHO MEAS - RAP SYSTOLE: 15 MMHG
BH CV ECHO MEAS - RV MAX PG: 2.5 MMHG
BH CV ECHO MEAS - RV MEAN PG: 1.4 MMHG
BH CV ECHO MEAS - RV V1 MAX: 79.8 CM/SEC
BH CV ECHO MEAS - RV V1 MEAN: 59.7 CM/SEC
BH CV ECHO MEAS - RV V1 VTI: 16.8 CM
BH CV ECHO MEAS - RVOT AREA: 2.4 CM^2
BH CV ECHO MEAS - RVOT DIAM: 1.7 CM
BH CV ECHO MEAS - RVSP: 35 MMHG
BH CV ECHO MEAS - SI(AO): 199.9 ML/M^2
BH CV ECHO MEAS - SI(CUBED): 41.2 ML/M^2
BH CV ECHO MEAS - SI(LVOT): 30.3 ML/M^2
BH CV ECHO MEAS - SI(MOD-SP2): 22.1 ML/M^2
BH CV ECHO MEAS - SI(MOD-SP4): 22.8 ML/M^2
BH CV ECHO MEAS - SI(TEICH): 40.8 ML/M^2
BH CV ECHO MEAS - SUP REN AO DIAM: 1.8 CM
BH CV ECHO MEAS - SV(AO): 298.6 ML
BH CV ECHO MEAS - SV(CUBED): 61.5 ML
BH CV ECHO MEAS - SV(LVOT): 45.3 ML
BH CV ECHO MEAS - SV(MOD-SP2): 33 ML
BH CV ECHO MEAS - SV(MOD-SP4): 34 ML
BH CV ECHO MEAS - SV(RVOT): 39.5 ML
BH CV ECHO MEAS - SV(TEICH): 61 ML
BH CV ECHO MEAS - TAPSE (>1.6): 2.5 CM
BH CV ECHO MEAS - TR MAX VEL: 223.7 CM/SEC
BH CV ECHO MEASUREMENTS AVERAGE E/E' RATIO: 9.42
BH CV XLRA - RV BASE: 2.4 CM
BH CV XLRA - RV LENGTH: 5.5 CM
BH CV XLRA - RV MID: 2.8 CM
BH CV XLRA - TDI S': 15.7 CM/SEC
LEFT ATRIUM VOLUME INDEX: 38 ML/M2
LV EF 2D ECHO EST: 68 %
MAXIMAL PREDICTED HEART RATE: 150 BPM
SINUS: 2.9 CM
STJ: 2.7 CM
STRESS TARGET HR: 128 BPM

## 2020-12-02 ENCOUNTER — TELEPHONE (OUTPATIENT)
Dept: CARDIOLOGY | Facility: CLINIC | Age: 70
End: 2020-12-02

## 2020-12-02 NOTE — TELEPHONE ENCOUNTER
Pt called she would like you call and explain the results of her echo.   She can be reached at #[phone].    Thanks,  Nelida

## 2021-01-24 ENCOUNTER — IMMUNIZATION (OUTPATIENT)
Dept: VACCINE CLINIC | Facility: HOSPITAL | Age: 71
End: 2021-01-24

## 2021-01-24 PROCEDURE — 91300 HC SARSCOV02 VAC 30MCG/0.3ML IM: CPT | Performed by: INTERNAL MEDICINE

## 2021-01-24 PROCEDURE — 0001A: CPT | Performed by: INTERNAL MEDICINE

## 2021-02-14 ENCOUNTER — IMMUNIZATION (OUTPATIENT)
Dept: VACCINE CLINIC | Facility: HOSPITAL | Age: 71
End: 2021-02-14

## 2021-02-14 PROCEDURE — 0002A: CPT | Performed by: INTERNAL MEDICINE

## 2021-02-14 PROCEDURE — 91300 HC SARSCOV02 VAC 30MCG/0.3ML IM: CPT | Performed by: INTERNAL MEDICINE

## 2021-03-05 ENCOUNTER — OFFICE VISIT (OUTPATIENT)
Dept: GASTROENTEROLOGY | Facility: CLINIC | Age: 71
End: 2021-03-05

## 2021-03-05 VITALS — WEIGHT: 106.1 LBS | BODY MASS INDEX: 17.68 KG/M2 | HEIGHT: 65 IN | TEMPERATURE: 97.2 F

## 2021-03-05 DIAGNOSIS — R19.5 POSITIVE COLORECTAL CANCER SCREENING USING COLOGUARD TEST: Primary | ICD-10-CM

## 2021-03-05 PROCEDURE — 99204 OFFICE O/P NEW MOD 45 MIN: CPT | Performed by: INTERNAL MEDICINE

## 2021-03-05 RX ORDER — LISINOPRIL AND HYDROCHLOROTHIAZIDE 12.5; 1 MG/1; MG/1
TABLET ORAL
COMMUNITY
Start: 2021-02-09 | End: 2021-06-28 | Stop reason: SDUPTHER

## 2021-03-05 RX ORDER — SODIUM CHLORIDE, SODIUM LACTATE, POTASSIUM CHLORIDE, CALCIUM CHLORIDE 600; 310; 30; 20 MG/100ML; MG/100ML; MG/100ML; MG/100ML
30 INJECTION, SOLUTION INTRAVENOUS CONTINUOUS
Status: CANCELLED | OUTPATIENT
Start: 2021-04-12

## 2021-03-05 NOTE — PROGRESS NOTES
Chief Complaint   Patient presents with   • Positive Cologuard     Subjective   HPI  Abigail Rizzo is a 70 y.o. female who presents today for new patient evaluation.    Referred for positive cologuard.  No prior colonoscopy. No current Gi complaints.    Hx of CLL maintained on Calquence.      Reviewed CT A/P from 10/2020 - no abnormalities of GI tract noted.      Objective   Vitals:    03/05/21 0807   Temp: 97.2 °F (36.2 °C)     Physical Exam  Vitals signs reviewed.   Constitutional:       Appearance: She is well-developed.   HENT:      Head: Normocephalic and atraumatic.   Abdominal:      General: Bowel sounds are normal. There is no distension.      Palpations: Abdomen is soft. There is no mass.      Tenderness: There is no abdominal tenderness.      Hernia: No hernia is present.   Skin:     General: Skin is warm and dry.   Neurological:      Mental Status: She is alert and oriented to person, place, and time.   Psychiatric:         Behavior: Behavior normal.         Thought Content: Thought content normal.         Judgment: Judgment normal.       The following data was reviewed by: Malick Rosales MD on 03/05/2021:  Common labs    Common Labsle 5/1/20 5/1/20 10/21/20 10/21/20    0943 0943 0751 0751   Glucose  91     BUN  20     Creatinine  0.96     eGFR Non African Am  58 (A)     Sodium  142     Potassium  5.0 (A)     Chloride  100     Calcium  10.0     Albumin  4.30 3.9    Total Bilirubin  0.3 0.3    Alkaline Phosphatase  66 66    AST (SGOT)  20 16    ALT (SGPT)  16 13    WBC 8.61      Hemoglobin 13.8      Hematocrit 42.6      Platelets 209      Uric Acid    5.9   (A) Abnormal value            Data reviewed: Radiologic studies CT A/P from 10/2020   Assessment/Plan   Assessment:     1. Positive colorectal cancer screening using Cologuard test      Plan:   Colonoscopy will be scheduled to evaluate the patient's positive cologuard test          Malick Rosales M.D.  [facility]  [address]  [place] [address]  Office: [phone]

## 2021-03-26 ENCOUNTER — TRANSCRIBE ORDERS (OUTPATIENT)
Dept: SLEEP MEDICINE | Facility: HOSPITAL | Age: 71
End: 2021-03-26

## 2021-03-26 DIAGNOSIS — Z01.818 OTHER SPECIFIED PRE-OPERATIVE EXAMINATION: Primary | ICD-10-CM

## 2021-04-09 ENCOUNTER — LAB (OUTPATIENT)
Dept: LAB | Facility: HOSPITAL | Age: 71
End: 2021-04-09

## 2021-04-09 DIAGNOSIS — Z01.818 OTHER SPECIFIED PRE-OPERATIVE EXAMINATION: ICD-10-CM

## 2021-04-09 PROCEDURE — U0004 COV-19 TEST NON-CDC HGH THRU: HCPCS

## 2021-04-09 PROCEDURE — U0005 INFEC AGEN DETEC AMPLI PROBE: HCPCS

## 2021-04-09 PROCEDURE — C9803 HOPD COVID-19 SPEC COLLECT: HCPCS

## 2021-04-10 LAB — SARS-COV-2 ORF1AB RESP QL NAA+PROBE: NOT DETECTED

## 2021-04-12 ENCOUNTER — HOSPITAL ENCOUNTER (OUTPATIENT)
Facility: HOSPITAL | Age: 71
Setting detail: HOSPITAL OUTPATIENT SURGERY
Discharge: HOME | End: 2021-04-12
Attending: INTERNAL MEDICINE | Admitting: INTERNAL MEDICINE

## 2021-04-12 ENCOUNTER — ANESTHESIA (OUTPATIENT)
Dept: GASTROENTEROLOGY | Facility: HOSPITAL | Age: 71
End: 2021-04-12

## 2021-04-12 ENCOUNTER — ANESTHESIA EVENT (OUTPATIENT)
Dept: GASTROENTEROLOGY | Facility: HOSPITAL | Age: 71
End: 2021-04-12

## 2021-04-12 VITALS
DIASTOLIC BLOOD PRESSURE: 71 MMHG | TEMPERATURE: 98 F | OXYGEN SATURATION: 100 % | RESPIRATION RATE: 16 BRPM | SYSTOLIC BLOOD PRESSURE: 139 MMHG | HEART RATE: 63 BPM | BODY MASS INDEX: 17.42 KG/M2 | HEIGHT: 64 IN | WEIGHT: 102 LBS

## 2021-04-12 DIAGNOSIS — R19.5 POSITIVE COLORECTAL CANCER SCREENING USING COLOGUARD TEST: ICD-10-CM

## 2021-04-12 PROCEDURE — 45385 COLONOSCOPY W/LESION REMOVAL: CPT | Performed by: INTERNAL MEDICINE

## 2021-04-12 PROCEDURE — 25010000002 PROPOFOL 10 MG/ML EMULSION: Performed by: NURSE ANESTHETIST, CERTIFIED REGISTERED

## 2021-04-12 PROCEDURE — 88305 TISSUE EXAM BY PATHOLOGIST: CPT | Performed by: INTERNAL MEDICINE

## 2021-04-12 DEVICE — DEV CLIP ENDO RESOLUTION360 CONTRL ROT 235CM: Type: IMPLANTABLE DEVICE | Site: SIGMOID COLON | Status: FUNCTIONAL

## 2021-04-12 RX ORDER — LIDOCAINE HYDROCHLORIDE 20 MG/ML
INJECTION, SOLUTION INFILTRATION; PERINEURAL AS NEEDED
Status: DISCONTINUED | OUTPATIENT
Start: 2021-04-12 | End: 2021-04-12 | Stop reason: SURG

## 2021-04-12 RX ORDER — SODIUM CHLORIDE, SODIUM LACTATE, POTASSIUM CHLORIDE, CALCIUM CHLORIDE 600; 310; 30; 20 MG/100ML; MG/100ML; MG/100ML; MG/100ML
30 INJECTION, SOLUTION INTRAVENOUS CONTINUOUS
Status: DISCONTINUED | OUTPATIENT
Start: 2021-04-12 | End: 2021-04-12 | Stop reason: HOSPADM

## 2021-04-12 RX ORDER — PROPOFOL 10 MG/ML
VIAL (ML) INTRAVENOUS CONTINUOUS PRN
Status: DISCONTINUED | OUTPATIENT
Start: 2021-04-12 | End: 2021-04-12 | Stop reason: SURG

## 2021-04-12 RX ADMIN — LIDOCAINE HYDROCHLORIDE 60 MG: 20 INJECTION, SOLUTION INFILTRATION; PERINEURAL at 10:40

## 2021-04-12 RX ADMIN — SODIUM CHLORIDE, POTASSIUM CHLORIDE, SODIUM LACTATE AND CALCIUM CHLORIDE 30 ML/HR: 600; 310; 30; 20 INJECTION, SOLUTION INTRAVENOUS at 10:28

## 2021-04-12 RX ADMIN — PROPOFOL 160 MCG/KG/MIN: 10 INJECTION, EMULSION INTRAVENOUS at 10:40

## 2021-04-12 NOTE — DISCHARGE INSTRUCTIONS
For the next 24 hours patient needs to be with a responsible adult.    For 24 hours DO NOT drive, operate machinery, appliances, drink alcohol, make important decisions or sign legal documents.    Start with a light or bland diet if you are feeling sick to your stomach otherwise advance to regular diet as tolerated.    Follow recommendations on procedure report if provided by your doctor.    Call Dr BENSON for problems [phone]    Problems may include but not limited to: large amounts of bleeding, trouble breathing, repeated vomiting, severe unrelieved pain, fever or chills.

## 2021-04-12 NOTE — H&P
[facility]  Pre Procedure History & Physical    Chief Complaint:   Positive cologuard    Subjective     HPI:   Abigail Rizzo is a 70 y.o. female who presents today for new patient evaluation.     Referred for positive cologuard.  No prior colonoscopy. No current Gi complaints.    Hx of CLL maintained on Calquence.       Reviewed CT A/P from 10/2020 - no abnormalities of GI tract noted.      Past Medical History:   Past Medical History:   Diagnosis Date   • Cerebral aneurysm    • CLL (chronic lymphocytic leukemia) (CMS/HCC)     /SLL   • H/O Broken shoulder     S/P seizure while on Asendin.   • H/O Foot deformities     Various foot deformities corrected from 1979   • H/O Ovarian cysts    • Hypertension    • Injury of back    • PONV (postoperative nausea and vomiting)    • Scoliosis     Gives back pain       Family History:  Family History   Problem Relation Age of Onset   • Leukemia Mother 85        CLL   • Allergy (severe) Mother    • Hypertension Mother    • Skin cancer Mother 16   • Thrombosis Father         Cerebral thrombosis   • Bleeding Disorder Sister    • Cervical cancer Sister 49       Social History:   reports that she has never smoked. She has never used smokeless tobacco. She reports current alcohol use. She reports that she does not use drugs.    Medications:   Medications Prior to Admission   Medication Sig Dispense Refill Last Dose   • amLODIPine (NORVASC) 5 MG tablet Take 1 tablet by mouth Daily. 90 tablet 3 4/12/2021 at Unknown time   • amoxicillin (AMOXIL) 500 MG capsule Take 500 mg by mouth. Prior to any dental procedure   Past Month at Unknown time   • lisinopril (PRINIVIL,ZESTRIL) 20 MG tablet Take 1 tablet by mouth Daily. 90 tablet 3 4/12/2021 at Unknown time   • lisinopril-hydrochlorothiazide (PRINZIDE,ZESTORETIC) 10-12.5 MG per tablet    4/12/2021 at Unknown time   • acalabrutinib (CALQUENCE) 100 MG capsule capsule Take 100 mg by mouth Daily.   4/10/2021   • acyclovir  "(ZOVIRAX) 800 MG tablet Take 800 mg by mouth Daily.   4/10/2021   • Biotin 1000 MCG tablet Take 2,000 mcg by mouth Daily.   4/8/2021   • calcium carbonate (OS-TYRON) 600 MG tablet Take 600 mg by mouth Daily.   4/8/2021   • Multiple Vitamins-Minerals (MULTIVITAMIN ADULT PO) Take  by mouth Daily.   4/8/2021   • Psyllium (METAMUCIL) 28.3 % powder Take  by mouth Daily.   4/8/2021   • vitamin B-12 (CYANOCOBALAMIN) 100 MCG tablet Take 50 mcg by mouth Daily.   4/8/2021   • vitamin C (ASCORBIC ACID) 500 MG tablet Take 500 mg by mouth Daily.   4/8/2021       Allergies:  Amoxapine, Strawberry extract, and Diphenhydramine hcl    ROS:    Pertinent items are noted in HPI     Objective     Blood pressure 145/74, pulse 65, temperature 98 °F (36.7 °C), temperature source Oral, resp. rate 18, height 162.6 cm (64\"), weight 46.3 kg (102 lb), SpO2 100 %.    Physical Exam   Constitutional: Pt is oriented to person, place, and time and well-developed, well-nourished, and in no distress.   HENT:   Mouth/Throat: Oropharynx is clear and moist.   Neck: Normal range of motion. Neck supple.   Cardiovascular: Normal rate, regular rhythm and normal heart sounds.    Pulmonary/Chest: Effort normal and breath sounds normal. No respiratory distress. No  wheezes.   Abdominal: Soft. Bowel sounds are normal.   Skin: Skin is warm and dry.   Psychiatric: Mood, memory, affect and judgment normal.     Assessment/Plan     Diagnosis:  Positive cologuard    Anticipated Surgical Procedure:  Colonoscopy    The risks, benefits, and alternatives of this procedure have been discussed with the patient or the responsible party- the patient understands and agrees to proceed.                                                                "

## 2021-04-12 NOTE — ANESTHESIA PREPROCEDURE EVALUATION
Anesthesia Evaluation     Patient summary reviewed and Nursing notes reviewed   NPO Solid Status: > 8 hours  NPO Liquid Status: > 2 hours           Airway   Mallampati: I  TM distance: >3 FB  Neck ROM: full  No difficulty expected  Dental - normal exam     Pulmonary - negative pulmonary ROS and normal exam   Cardiovascular - normal exam    (+) hypertension,       Neuro/Psych- negative ROS  GI/Hepatic/Renal/Endo - negative ROS     Musculoskeletal (-) negative ROS    Abdominal  - normal exam    Bowel sounds: normal.   Substance History - negative use     OB/GYN negative ob/gyn ROS         Other   blood dyscrasia,   history of cancer      Other Comment: CLL                Anesthesia Plan    ASA 2     MAC   (Pt requests extra pillows for left arm positioning and no pulling of shoulders while asleep;She also wants to wear her own mouth guard.)    Anesthetic plan, all risks, benefits, and alternatives have been provided, discussed and informed consent has been obtained with: patient.

## 2021-04-12 NOTE — ANESTHESIA POSTPROCEDURE EVALUATION
"Patient: Abigail Rizzo    Procedure Summary     Date: 04/12/21 Room / Location: [facility] ENDOSCOPY 10 / [facility] ENDOSCOPY    Anesthesia Start: 1035 Anesthesia Stop: 1107    Procedure: COLONOSCOPY TO CECUM WITH COLD SNARE POLYPECTOMIES WITH CLIP TO RECTOSIGMOID SITE (N/A ) Diagnosis:       Positive colorectal cancer screening using Cologuard test      (Positive colorectal cancer screening using Cologuard test [R19.5])    Surgeons: Malick Rosales MD Provider: Denilson Paul MD    Anesthesia Type: MAC ASA Status: 2          Anesthesia Type: MAC    Vitals  Vitals Value Taken Time   /71 04/12/21 1127   Temp 36.7 °C (98 °F) 04/12/21 1112   Pulse 63 04/12/21 1127   Resp 16 04/12/21 1127   SpO2 100 % 04/12/21 1127           Post Anesthesia Care and Evaluation    Patient location during evaluation: PACU  Patient participation: complete - patient participated  Level of consciousness: awake  Pain score: 0  Pain management: adequate  Airway patency: patent  Anesthetic complications: No anesthetic complications  PONV Status: none  Cardiovascular status: acceptable  Respiratory status: acceptable  Hydration status: acceptable    Comments: /71 (BP Location: Left arm, Patient Position: Lying)   Pulse 63   Temp 36.7 °C (98 °F) (Oral)   Resp 16   Ht 162.6 cm (64\")   Wt 46.3 kg (102 lb)   SpO2 100%   BMI 17.51 kg/m²       "

## 2021-04-13 LAB
LAB AP CASE REPORT: NORMAL
PATH REPORT.FINAL DX SPEC: NORMAL
PATH REPORT.GROSS SPEC: NORMAL

## 2021-04-19 NOTE — PROGRESS NOTES
Colonoscopy Pathology Results:    Tubular adenomas: 3  Villous adenomas: 0  Sessile serrated polyps: 0  Traditional serrated adenomas: 0  Hyperplastic polyps: 0    Recommended surveillance interval: 3 years    Office f/u: PRN

## 2021-06-28 ENCOUNTER — OFFICE VISIT (OUTPATIENT)
Dept: CARDIOLOGY | Facility: CLINIC | Age: 71
End: 2021-06-28

## 2021-06-28 VITALS
DIASTOLIC BLOOD PRESSURE: 82 MMHG | HEART RATE: 68 BPM | WEIGHT: 103 LBS | BODY MASS INDEX: 17.58 KG/M2 | HEIGHT: 64 IN | SYSTOLIC BLOOD PRESSURE: 130 MMHG

## 2021-06-28 DIAGNOSIS — R94.31 ABNORMAL EKG: ICD-10-CM

## 2021-06-28 DIAGNOSIS — I10 ESSENTIAL HYPERTENSION: Primary | ICD-10-CM

## 2021-06-28 PROCEDURE — 99213 OFFICE O/P EST LOW 20 MIN: CPT | Performed by: INTERNAL MEDICINE

## 2021-06-28 PROCEDURE — 93000 ELECTROCARDIOGRAM COMPLETE: CPT | Performed by: INTERNAL MEDICINE

## 2021-06-28 RX ORDER — LISINOPRIL AND HYDROCHLOROTHIAZIDE 12.5; 1 MG/1; MG/1
1 TABLET ORAL DAILY
Qty: 90 TABLET | Refills: 3 | Status: SHIPPED | OUTPATIENT
Start: 2021-06-28 | End: 2022-06-28

## 2021-06-28 NOTE — PROGRESS NOTES
Date of Office Visit:  21  Encounter Provider: Lance Marie MD  Place of Service: [facility] CARDIOLOGY  Patient Name: Abigail Rizzo  :1950    Chief Complaint   Patient presents with   • Follow-up     6 month   • Hypertension   :     HPI:  70 y.o. female, new to me, who presents today for follow-up.   She is a patient with a past medical history significant for hypertension, CLL, and cerebral aneurysm.  She was first evaluated by me on 10/22/2020 for elevated blood pressure.  She had slight repolarization abnormalities on her EKG, and I ordered an echocardiogram.  Her lisinopril was increased to 20 mg daily.  She is here today for follow-up and echocardiogram.    She has been doing very well since her last visit and followed up with Armida Jurado and her primary care.  She was moved to lisinopril-HCTZ and is been doing very well with that with improved blood pressure measurements.  She denies any orthopnea or PND.  She has no lower extremity edema.  She feels that overall she is doing well.  She continues to exercise without any limitation    Past Medical History:   Diagnosis Date   • Aneurysm (CMS/HCC)    • Cerebral aneurysm    • CLL (chronic lymphocytic leukemia) (CMS/HCC)     /SLL   • H/O Broken shoulder     S/P seizure while on Asendin.   • H/O Foot deformities     Various foot deformities corrected from    • H/O Ovarian cysts    • Hypertension    • Injury of back    • PONV (postoperative nausea and vomiting)    • Scoliosis     Gives back pain       Past Surgical History:   Procedure Laterality Date   • COLONOSCOPY N/A 2021    Procedure: COLONOSCOPY TO CECUM WITH COLD SNARE POLYPECTOMIES WITH CLIP TO RECTOSIGMOID SITE;  Surgeon: Malick Rosales MD;  Location: [facility] ENDOSCOPY;  Service: Gastroenterology;  Laterality: N/A;  PRE:Positive cologuard  POST:POLYPS   • FOOT SURGERY       to  various foot deformities corrected   • NASAL SEPTUM  "SURGERY     • OOPHORECTOMY Left 1992    and part of right   • SHOULDER SURGERY Left 1989    Replacement of the left shoulder in 1989 in [place], [place] because of osteoarthitis, trauma, and pain   • SHOULDER SURGERY Left 2005    Shoulder revision       Social History     Socioeconomic History   • Marital status:      Spouse name: Denilson   • Number of children: 0   • Years of education: College   • Highest education level: Not on file   Tobacco Use   • Smoking status: Never Smoker   • Smokeless tobacco: Never Used   Vaping Use   • Vaping Use: Never used   Substance and Sexual Activity   • Alcohol use: Yes     Alcohol/week: 1.0 standard drinks     Types: 1 Glasses of wine per week     Comment: Occasional   • Drug use: No   • Sexual activity: Defer       Family History   Problem Relation Age of Onset   • Leukemia Mother 85        CLL   • Allergy (severe) Mother    • Hypertension Mother    • Skin cancer Mother 16   • Thrombosis Father         Cerebral thrombosis   • Bleeding Disorder Sister    • Cervical cancer Sister 49       Review of Systems   Constitutional: Negative for chills, fever and malaise/fatigue.   Cardiovascular: Negative for chest pain, dyspnea on exertion, leg swelling, near-syncope, orthopnea, palpitations, paroxysmal nocturnal dyspnea and syncope.   Respiratory: Negative for cough and shortness of breath.    Musculoskeletal: Negative for joint pain, joint swelling and myalgias.   Gastrointestinal: Negative for abdominal pain, diarrhea, melena, nausea and vomiting.   Genitourinary: Negative for frequency and hematuria.   Neurological: Negative for light-headedness, numbness, paresthesias and seizures.   Allergic/Immunologic: Negative.    All other systems reviewed and are negative.      Allergies   Allergen Reactions   • Amoxapine Other (See Comments)     Passed out  01/23/2006-ASENDIN seizure  Passed out     • Strawberry Extract Other (See Comments)     \"burns mouth\"   • Diphenhydramine Hcl " "Other (See Comments)     Redness,  Pt prefers not to take         Current Outpatient Medications:   •  acalabrutinib (CALQUENCE) 100 MG capsule capsule, Take 100 mg by mouth Daily., Disp: , Rfl:   •  acyclovir (ZOVIRAX) 800 MG tablet, Take 800 mg by mouth Daily., Disp: , Rfl:   •  amLODIPine (NORVASC) 5 MG tablet, Take 1 tablet by mouth Daily., Disp: 90 tablet, Rfl: 3  •  amoxicillin (AMOXIL) 500 MG capsule, Take 500 mg by mouth. Prior to any dental procedure, Disp: , Rfl:   •  Biotin 1000 MCG tablet, Take 2,000 mcg by mouth Daily., Disp: , Rfl:   •  calcium carbonate (OS-TYRON) 600 MG tablet, Take 600 mg by mouth Daily., Disp: , Rfl:   •  lisinopril-hydrochlorothiazide (PRINZIDE,ZESTORETIC) 10-12.5 MG per tablet, , Disp: , Rfl:   •  Multiple Vitamins-Minerals (MULTIVITAMIN ADULT PO), Take  by mouth Daily., Disp: , Rfl:   •  Psyllium (METAMUCIL) 28.3 % powder, Take  by mouth Daily., Disp: , Rfl:   •  vitamin B-12 (CYANOCOBALAMIN) 100 MCG tablet, Take 50 mcg by mouth Daily., Disp: , Rfl:   •  vitamin C (ASCORBIC ACID) 500 MG tablet, Take 500 mg by mouth Daily., Disp: , Rfl:   •  vitamin D3 (vitamin d) 125 MCG (5000 UT) capsule capsule, Take 5,000 Units by mouth Daily., Disp: , Rfl:       Objective:     Vitals:    06/28/21 0830   BP: 130/82   Pulse: 68   Weight: 46.7 kg (103 lb)   Height: 162.6 cm (64\")     Body mass index is 17.68 kg/m².    PHYSICAL EXAM:    Constitutional:       General: Not in acute distress.     Appearance: Well-developed. Not diaphoretic.   Eyes:      Pupils: Pupils are equal, round, and reactive to light.   HENT:      Head: Normocephalic and atraumatic.   Neck:      Thyroid: No thyromegaly.      Vascular: No JVD.   Pulmonary:      Effort: Pulmonary effort is normal. No respiratory distress.      Breath sounds: Normal breath sounds.   Cardiovascular:      Normal rate. Regular rhythm.   Pulses:     Intact distal pulses.   Abdominal:      General: Bowel sounds are normal. There is no distension.      " Palpations: Abdomen is soft. There is no hepatomegaly or splenomegaly.      Tenderness: There is no abdominal tenderness.   Musculoskeletal: Normal range of motion. Skin:     General: Skin is warm and dry.      Findings: No erythema.   Neurological:      Mental Status: Alert and oriented to person, place, and time.   Psychiatric:         Behavior: Behavior normal.         Judgment: Judgment normal.           ECG 12 Lead    Date/Time: 6/28/2021 9:12 AM  Performed by: Lance Marie MD  Authorized by: Lance Marie MD   Comparison: compared with previous ECG from 11/23/2020  Similar to previous ECG  Rhythm: sinus rhythm  Rate: normal  QRS axis: normal    Clinical impression: normal ECG                 Assessment:      No diagnosis found.  No orders of the defined types were placed in this encounter.    Plan:   70-year-old with a medical history of LVH, essential hypertension who presents to me in follow-up.  She is doing very well and her blood pressure is perfectly controlled at this point in time.  She is tolerating her lisinopril-HCTZ therapy without any significant issues.  She has not had hyperkalemia or any other electrolyte abnormalities on this regimen.  I have reviewed her home blood pressure numbers and they look good.    1.  Essential hypertension: Well-controlled.  Continue current dose.  Lab work reviewed and no significant issues with renal insufficiency or hyperkalemia on the ACE-HCTZ therapy.  I will refill this today.    2.  LVH: Mild.  No repeat echo indicated at this time.    I will follow her up yearly.  Plan on repeat echo in a couple years.

## 2021-10-07 ENCOUNTER — TELEPHONE (OUTPATIENT)
Dept: NEUROLOGY | Facility: OTHER | Age: 71
End: 2021-10-07

## 2021-10-07 NOTE — TELEPHONE ENCOUNTER
PT CALLED ASKING WHAT SHE NEEDES TO DO TO BECOME ESTABLISHED WITH A PROVIDER IN THIS OFFICE.  TOLD PT SHE WILL HAVE TO BE REFERRER FROM PCP OR A PROVIDER SHE SEES.  PT IS DUE FOR A MRI-A SHE IS TO SCHEDULED NEXT WEEK.  PT STATE SHE WILL SPEAK WITH HER PROVIDER TO HAVE THEM SEND A REFERRAL

## 2021-10-08 ENCOUNTER — TRANSCRIBE ORDERS (OUTPATIENT)
Dept: ADMINISTRATIVE | Facility: HOSPITAL | Age: 71
End: 2021-10-08

## 2021-10-08 DIAGNOSIS — I67.1 CEREBRAL ANEURYSM: Primary | ICD-10-CM

## 2021-10-30 ENCOUNTER — HOSPITAL ENCOUNTER (OUTPATIENT)
Dept: MRI IMAGING | Facility: HOSPITAL | Age: 71
Discharge: HOME | End: 2021-10-30
Admitting: NURSE PRACTITIONER

## 2021-10-30 DIAGNOSIS — I67.1 CEREBRAL ANEURYSM: ICD-10-CM

## 2021-10-30 PROCEDURE — 70544 MR ANGIOGRAPHY HEAD W/O DYE: CPT

## 2021-11-02 NOTE — PROGRESS NOTES
Subjective   Patient ID: Abigail Rizzo is a 71 y.o. female is being seen for consultation today at the request of Oly Montano* for an aneurysm.  MRA head done on 10/30/21.  Former Dr Ramirez pt.  Today pt reports no problems.    71-year-old right-handed woman who presents today for continued follow-up of the 2 incidental cerebral aneurysms that were found because of the need for imaging for a clinical trial for chronic lymphocytic leukemia.  She has question of a possible family history of a ruptured intracranial aneurysm in her father plus has hypertension, but is a non-smoker.  Patient's hypertension is controlled with medication.  The aneurysms have been stable on follow-up imaging.  Patient reports no neurologic changes or symptoms attributable to the aneurysms.         The following portions of the patient's history were reviewed and updated as appropriate: allergies, current medications, past family history, past medical history, past social history, past surgical history and problem list.    Review of Systems   Constitutional: Negative for chills and fever.   HENT: Negative for ear pain and tinnitus.    Eyes: Negative for pain and visual disturbance.   Respiratory: Negative for cough and shortness of breath.    Cardiovascular: Negative for chest pain and palpitations.   Gastrointestinal: Negative for nausea and vomiting.   Genitourinary: Negative for difficulty urinating and enuresis.   Musculoskeletal: Negative for gait problem.   Skin: Negative for rash.   Neurological: Negative for dizziness, tremors, seizures, syncope, speech difficulty, light-headedness, numbness and headaches.   Psychiatric/Behavioral: Negative for confusion and decreased concentration.       Objective    Vitals:    11/09/21 0952   BP: 110/70   Pulse: 58   Resp: 16   Temp: 97.7 °F (36.5 °C)   SpO2: 100%     Body mass index is 17.85 kg/m².    Physical Exam  Vitals and nursing note reviewed.   Constitutional:       General: She is  not in acute distress.     Appearance: Normal appearance.   HENT:      Head: Normocephalic.      Nose: Nose normal.      Mouth/Throat:      Mouth: Mucous membranes are moist.   Eyes:      Extraocular Movements: Extraocular movements intact.      Conjunctiva/sclera: Conjunctivae normal.      Pupils: Pupils are equal, round, and reactive to light.   Cardiovascular:      Rate and Rhythm: Normal rate.      Pulses: Normal pulses.   Pulmonary:      Effort: Pulmonary effort is normal.   Musculoskeletal:         General: Normal range of motion.      Cervical back: Normal range of motion.   Skin:     General: Skin is warm and dry.   Neurological:      General: No focal deficit present.      Mental Status: She is alert and oriented to person, place, and time.      Cranial Nerves: No cranial nerve deficit.      Sensory: No sensory deficit.      Motor: No weakness.      Coordination: Coordination normal.      Gait: Gait normal.   Psychiatric:         Mood and Affect: Mood normal.         Behavior: Behavior normal.         Thought Content: Thought content normal.         Judgment: Judgment normal.       Neurologic Exam     Mental Status   Oriented to person, place, and time.     Cranial Nerves     CN III, IV, VI   Pupils are equal, round, and reactive to light.      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  No change from the previous exam dated 2018 or 2020 is appreciated.  No new intracranial aneurysms are seen.  Medical Decision Makin-year-old female with 2 intracranial aneurysms undergoing imaging surveillance with no change in her MR angiogram since  and .  Patient is a non-smoker with no significant family history except  for possible ruptured aneurysm in the patient's father.  Using the PHASES score the patient's rupture risk is 0.9% over the next 5 years and 2.5% lifetime for the larger MCA aneurysm with the smaller more distal aneurysm even less.  Patient will return in 2 years for her next follow-up imaging and endovascular treatment with flow diversion was reviewed for possible future therapy should the aneurysms continue to grow.  Diagnoses and all orders for this visit:    1. Cerebral aneurysm without rupture (Primary)  -     MRI Angiogram Head With & Without Contrast; Future    2. Primary hypertension      Return in about 2 years (around 11/9/2023) for MRA Head, Recheck.

## 2021-11-05 RX ORDER — AMLODIPINE BESYLATE 5 MG/1
TABLET ORAL
Qty: 90 TABLET | Refills: 3 | Status: SHIPPED | OUTPATIENT
Start: 2021-11-05 | End: 2022-10-24

## 2021-11-09 ENCOUNTER — OFFICE VISIT (OUTPATIENT)
Dept: NEUROSURGERY | Facility: CLINIC | Age: 71
End: 2021-11-09

## 2021-11-09 VITALS
RESPIRATION RATE: 16 BRPM | HEART RATE: 58 BPM | OXYGEN SATURATION: 100 % | BODY MASS INDEX: 17.75 KG/M2 | SYSTOLIC BLOOD PRESSURE: 110 MMHG | WEIGHT: 104 LBS | DIASTOLIC BLOOD PRESSURE: 70 MMHG | HEIGHT: 64 IN | TEMPERATURE: 97.7 F

## 2021-11-09 DIAGNOSIS — I10 PRIMARY HYPERTENSION: ICD-10-CM

## 2021-11-09 DIAGNOSIS — I67.1 CEREBRAL ANEURYSM WITHOUT RUPTURE: Primary | ICD-10-CM

## 2021-11-09 PROCEDURE — 99204 OFFICE O/P NEW MOD 45 MIN: CPT | Performed by: RADIOLOGY

## 2021-11-09 RX ORDER — OXYCODONE HYDROCHLORIDE AND ACETAMINOPHEN 5; 325 MG/1; MG/1
TABLET ORAL
COMMUNITY
Start: 2021-10-04 | End: 2021-11-09

## 2021-11-09 RX ORDER — GABAPENTIN 100 MG/1
300 CAPSULE ORAL 3 TIMES DAILY
COMMUNITY
Start: 2021-10-04 | End: 2021-11-09

## 2021-11-09 RX ORDER — ACETAMINOPHEN 325 MG/1
650 TABLET ORAL
COMMUNITY

## 2021-11-09 RX ORDER — MULTIVITAMIN
1 CAPSULE ORAL DAILY
COMMUNITY

## 2022-06-28 RX ORDER — LISINOPRIL AND HYDROCHLOROTHIAZIDE 12.5; 1 MG/1; MG/1
TABLET ORAL
Qty: 90 TABLET | Refills: 3 | Status: SHIPPED | OUTPATIENT
Start: 2022-06-28

## 2022-06-28 NOTE — TELEPHONE ENCOUNTER
Protocol Error     Normal labs done at [facility] under Care Everywhere 5/2022      Nelida

## 2022-06-30 ENCOUNTER — OFFICE VISIT (OUTPATIENT)
Dept: CARDIOLOGY | Facility: CLINIC | Age: 72
End: 2022-06-30

## 2022-06-30 VITALS
SYSTOLIC BLOOD PRESSURE: 118 MMHG | WEIGHT: 106 LBS | HEIGHT: 64 IN | HEART RATE: 62 BPM | BODY MASS INDEX: 18.1 KG/M2 | DIASTOLIC BLOOD PRESSURE: 70 MMHG

## 2022-06-30 DIAGNOSIS — I10 PRIMARY HYPERTENSION: Primary | ICD-10-CM

## 2022-06-30 PROCEDURE — 99213 OFFICE O/P EST LOW 20 MIN: CPT | Performed by: NURSE PRACTITIONER

## 2022-06-30 PROCEDURE — 93000 ELECTROCARDIOGRAM COMPLETE: CPT | Performed by: NURSE PRACTITIONER

## 2022-06-30 NOTE — PROGRESS NOTES
Date of Office Visit: 2022  Encounter Provider: MAGGI Reilly  Place of Service: [facility] CARDIOLOGY  Patient Name: Abigail Rizzo  :1950    Chief Complaint   Patient presents with   • Hypertension   :     HPI: Abigail Rizzo is a 71 y.o. female.  She is a patient of Dr. Marie's whom we have followed for the management of hypertension.  Echocardiogram from 2020 demonstrated normal LV function, mild LVH, and no significant valvular abnormalities.   She was last seen in the office by Dr. Marie in 2021 at which time she was doing well.  She denied any symptoms of angina or heart failure.  Her blood pressure was well controlled.  No changes were made to her regimen, and she was advised to follow-up in 1 year.   She seems to be doing well today. She denies chest pain, palpitations, shortness of breath, dizziness, syncope, or edema. She does not check blood pressures at home. She exercises daily without limitation.     Past Medical History:   Diagnosis Date   • Aneurysm (HCC)    • Cerebral aneurysm    • CLL (chronic lymphocytic leukemia) (HCC)     /SLL   • CLL (chronic lymphocytic leukemia) (HCC)    • H/O Broken shoulder     S/P seizure while on Asendin.   • H/O Foot deformities     Various foot deformities corrected from    • H/O Ovarian cysts    • Hypertension    • Injury of back    • PONV (postoperative nausea and vomiting)    • Scoliosis     Gives back pain       Past Surgical History:   Procedure Laterality Date   • COLONOSCOPY N/A 2021    Procedure: COLONOSCOPY TO CECUM WITH COLD SNARE POLYPECTOMIES WITH CLIP TO RECTOSIGMOID SITE;  Surgeon: Malick Rosales MD;  Location: [facility] ENDOSCOPY;  Service: Gastroenterology;  Laterality: N/A;  PRE:Positive cologuard  POST:POLYPS   • FOOT SURGERY       to  various foot deformities corrected   • NASAL SEPTUM SURGERY     • OOPHORECTOMY Left     and part of right   • SHOULDER SURGERY Left  "1989    Replacement of the left shoulder in 1989 in [place], [place] because of osteoarthitis, trauma, and pain   • SHOULDER SURGERY Left 2005    Shoulder revision       Social History     Socioeconomic History   • Marital status:      Spouse name: Denilson   • Number of children: 0   • Years of education: College   Tobacco Use   • Smoking status: Never Smoker   • Smokeless tobacco: Never Used   Vaping Use   • Vaping Use: Never used   Substance and Sexual Activity   • Alcohol use: Yes     Alcohol/week: 1.0 standard drink     Types: 1 Glasses of wine per week     Comment: Occasional   • Drug use: No   • Sexual activity: Defer       Family History   Problem Relation Age of Onset   • Leukemia Mother 85        CLL   • Allergy (severe) Mother    • Hypertension Mother    • Skin cancer Mother 16   • Thrombosis Father         Cerebral thrombosis   • Bleeding Disorder Sister    • Cervical cancer Sister 49       Review of Systems   Constitutional: Negative.   Cardiovascular: Negative.  Negative for chest pain, dyspnea on exertion, leg swelling, orthopnea, paroxysmal nocturnal dyspnea and syncope.   Respiratory: Negative.    Hematologic/Lymphatic: Negative for bleeding problem.   Musculoskeletal: Negative for falls.   Gastrointestinal: Negative for melena.   Neurological: Negative for dizziness and light-headedness.       Allergies   Allergen Reactions   • Amoxapine Other (See Comments)     Passed out  01/23/2006-ASENDIN seizure  Passed out     • Brindall Salmeron-Chromium Melissa Unknown - Low Severity     \"burns mouth\" (strawberry)    • Cinnamon Unknown - Low Severity     \"Burns mouth\"  \"Burns mouth\"   • Strawberry Extract Other (See Comments)     \"burns mouth\"   • Diphenhydramine Hcl Other (See Comments)     Redness,  Pt prefers not to take         Current Outpatient Medications:   •  acalabrutinib (CALQUENCE) 100 MG capsule capsule, Take 100 mg by mouth Daily., Disp: , Rfl:   •  acetaminophen (TYLENOL) 325 MG tablet, Take " "650 mg by mouth., Disp: , Rfl:   •  acyclovir (ZOVIRAX) 800 MG tablet, Take 800 mg by mouth Daily., Disp: , Rfl:   •  amLODIPine (NORVASC) 5 MG tablet, TAKE 1 TABLET DAILY, Disp: 90 tablet, Rfl: 3  •  amoxicillin (AMOXIL) 500 MG capsule, Take 500 mg by mouth. Prior to any dental procedure, Disp: , Rfl:   •  Biotin 1000 MCG tablet, Take 2,000 mcg by mouth Daily., Disp: , Rfl:   •  calcium carbonate (OS-TYRON) 600 MG tablet, Take 600 mg by mouth Daily., Disp: , Rfl:   •  lisinopril-hydrochlorothiazide (PRINZIDE,ZESTORETIC) 10-12.5 MG per tablet, TAKE 1 TABLET DAILY., Disp: 90 tablet, Rfl: 3  •  Multiple Vitamin (multivitamin) capsule, Take 1 capsule by mouth Daily., Disp: , Rfl:   •  Psyllium 28.3 % powder, Take  by mouth Daily., Disp: , Rfl:   •  vitamin B-12 (CYANOCOBALAMIN) 100 MCG tablet, Take 50 mcg by mouth Daily., Disp: , Rfl:   •  vitamin C (ASCORBIC ACID) 500 MG tablet, Take 500 mg by mouth Daily., Disp: , Rfl:   •  vitamin D3 125 MCG (5000 UT) capsule capsule, Take 5,000 Units by mouth Daily., Disp: , Rfl:       Objective:     Vitals:    06/30/22 0858   BP: 118/70   Pulse: 62   Weight: 48.1 kg (106 lb)   Height: 162.6 cm (64\")     Body mass index is 18.19 kg/m².    PHYSICAL EXAM:    Neck:      Vascular: No JVD.   Pulmonary:      Effort: Pulmonary effort is normal.      Breath sounds: Normal breath sounds.   Cardiovascular:      Normal rate. Regular rhythm.      Murmurs: There is no murmur.      No gallop. No click. No rub.   Pulses:     Intact distal pulses.           ECG 12 Lead    Date/Time: 6/30/2022 9:03 AM  Performed by: Armida Jurado APRN  Authorized by: Armida Jurado APRN   Comparison: compared with previous ECG from 6/28/2021  Similar to previous ECG  Rhythm: sinus rhythm  Rate: normal  BPM: 62                Assessment:       Diagnosis Plan   1. Primary hypertension  ECG 12 Lead     Orders Placed This Encounter   Procedures   • ECG 12 Lead     This order was created via procedure " documentation     Order Specific Question:   Release to patient     Answer:   Immediate          Plan:       1.  Primary hypertension. Blood pressure is well controled on amlodipine and lisinopril/HCTZ. No changes are being made.       Overall, patient is doing well. No changes are being made to her medical regimen. She can follow up with Dr. Marie in 1 year.       As always, it has been a pleasure to participate in your patient's care.      Sincerely,         MAGGI Vines

## 2022-10-05 ENCOUNTER — TELEPHONE (OUTPATIENT)
Dept: ONCOLOGY | Facility: CLINIC | Age: 72
End: 2022-10-05

## 2022-10-05 NOTE — TELEPHONE ENCOUNTER
Caller: Abigail Rizzo    Relationship: Self    Best call back number: [phone]    What is the best time to reach you: ANYTIME    Who are you requesting to speak with (clinical staff, provider,  specific staff member): SCEDULING    What was the call regarding: PT NEEDS TO SET UP A EVUSHELD INJECTION PER DR. PARKER.    CALL TO RENEE  Do you require a callback: YES

## 2022-10-19 ENCOUNTER — INFUSION (OUTPATIENT)
Dept: ONCOLOGY | Facility: HOSPITAL | Age: 72
End: 2022-10-19

## 2022-10-19 VITALS
RESPIRATION RATE: 16 BRPM | WEIGHT: 105 LBS | TEMPERATURE: 98.2 F | HEART RATE: 61 BPM | OXYGEN SATURATION: 100 % | SYSTOLIC BLOOD PRESSURE: 115 MMHG | DIASTOLIC BLOOD PRESSURE: 75 MMHG | BODY MASS INDEX: 18.02 KG/M2

## 2022-10-19 DIAGNOSIS — C91.10 CHRONIC LYMPHOID LEUKEMIA: Primary | ICD-10-CM

## 2022-10-19 PROCEDURE — M0220 HC INJECTION, TIXAGEVIMAB AND CILGAVIMAB: HCPCS | Performed by: INTERNAL MEDICINE

## 2022-10-19 PROCEDURE — 25010000002 INJECTION, TIXAGEVIMAB AND CILGAVIMAB,: Performed by: INTERNAL MEDICINE

## 2022-10-19 RX ADMIN — AZD7442 300 MG: KIT at 11:13

## 2022-10-24 RX ORDER — AMLODIPINE BESYLATE 5 MG/1
TABLET ORAL
Qty: 90 TABLET | Refills: 3 | Status: SHIPPED | OUTPATIENT
Start: 2022-10-24

## 2023-02-23 ENCOUNTER — TRANSCRIBE ORDERS (OUTPATIENT)
Dept: ADMINISTRATIVE | Facility: HOSPITAL | Age: 73
End: 2023-02-23
Payer: MEDICARE

## 2023-02-23 DIAGNOSIS — C91.10 CLL (CHRONIC LYMPHOCYTIC LEUKEMIA): Primary | ICD-10-CM

## 2023-06-19 RX ORDER — LISINOPRIL AND HYDROCHLOROTHIAZIDE 12.5; 1 MG/1; MG/1
TABLET ORAL
Qty: 90 TABLET | Refills: 3 | Status: SHIPPED | OUTPATIENT
Start: 2023-06-19

## 2023-08-09 ENCOUNTER — TRANSCRIBE ORDERS (OUTPATIENT)
Dept: ADMINISTRATIVE | Facility: HOSPITAL | Age: 73
End: 2023-08-09
Payer: MEDICARE

## 2023-08-09 DIAGNOSIS — C91.00 LYMPHOBLASTIC LEUKEMIA: Primary | ICD-10-CM

## 2023-08-09 DIAGNOSIS — C91.10 LEUKEMIA, LYMPHOCYTIC, CHRONIC: Primary | ICD-10-CM

## 2023-10-09 RX ORDER — AMLODIPINE BESYLATE 5 MG/1
5 TABLET ORAL DAILY
Qty: 90 TABLET | Refills: 3 | Status: SHIPPED | OUTPATIENT
Start: 2023-10-09

## 2023-10-31 ENCOUNTER — HOSPITAL ENCOUNTER (OUTPATIENT)
Dept: MRI IMAGING | Facility: HOSPITAL | Age: 73
Discharge: HOME | End: 2023-10-31
Admitting: RADIOLOGY
Payer: MEDICARE

## 2023-10-31 DIAGNOSIS — I67.1 CEREBRAL ANEURYSM WITHOUT RUPTURE: ICD-10-CM

## 2023-10-31 LAB — CREAT BLDA-MCNC: 1 MG/DL (ref 0.6–1.3)

## 2023-10-31 PROCEDURE — A9577 INJ MULTIHANCE: HCPCS | Performed by: RADIOLOGY

## 2023-10-31 PROCEDURE — 82565 ASSAY OF CREATININE: CPT

## 2023-10-31 PROCEDURE — 0 GADOBENATE DIMEGLUMINE 529 MG/ML SOLUTION: Performed by: RADIOLOGY

## 2023-10-31 PROCEDURE — 70546 MR ANGIOGRAPH HEAD W/O&W/DYE: CPT

## 2023-10-31 RX ADMIN — GADOBENATE DIMEGLUMINE 9 ML: 529 INJECTION, SOLUTION INTRAVENOUS at 09:16

## 2023-11-01 NOTE — PROGRESS NOTES
Subjective   Patient ID: Abigail Rizzo is a 73 y.o. female is here today for 2 year follow-up on her cerebral aneurysm. Patient had MRA head done at [facility] on 10/31/2023. Patient reports no symptoms and said she is feeling great.     History of Present Illness  73-year-old right-handed woman who presents today for continued follow-up of the 2 incidental cerebral aneurysms that were found because of the need for imaging for a clinical trial for chronic lymphocytic leukemia.  She has question of a possible family history of a ruptured intracranial aneurysm in her father plus has hypertension, but is a non-smoker.  Patient's hypertension is controlled with medication.  The aneurysms have been stable on follow-up imaging.  Patient reports no new neurologic changes or symptoms attributable to the aneurysms, but continues to be concerned of their presence and is considering possible treatment.  She wishes to review endovascular options for aneurysm occlusion.      The following portions of the patient's history were reviewed and updated as appropriate: She  has a past medical history of Aneurysm, Cerebral aneurysm, CLL (chronic lymphocytic leukemia), CLL (chronic lymphocytic leukemia), H/O Broken shoulder, H/O Foot deformities, H/O Ovarian cysts, Hypertension, Injury of back, Lumbosacral disc disease, PONV (postoperative nausea and vomiting), and Scoliosis.  She does not have any pertinent problems on file.  She  has a past surgical history that includes Shoulder surgery (Left, 1989); Oophorectomy (Left, 1992); Nasal septum surgery; Foot surgery; Shoulder surgery (Left, 2005); Colonoscopy (N/A, 04/12/2021); and Epidural block injection.  Her family history includes Allergy (severe) in her mother; Bleeding Disorder in her sister; Cervical cancer (age of onset: 49) in her sister; Hypertension in her mother; Leukemia (age of onset: 85) in her mother; Skin cancer (age of onset: 16) in her mother; Thrombosis in her father.  She   reports that she has never smoked. She has never used smokeless tobacco. She reports current alcohol use of about 1.0 standard drink of alcohol per week. She reports that she does not use drugs.  Current Outpatient Medications   Medication Sig Dispense Refill    acalabrutinib (CALQUENCE) 100 MG capsule capsule Take 1 capsule by mouth Daily.      acetaminophen (TYLENOL) 325 MG tablet Take 2 tablets by mouth.      acyclovir (ZOVIRAX) 800 MG tablet Take 1 tablet by mouth Daily.      amLODIPine (NORVASC) 5 MG tablet Take 1 tablet by mouth Daily. 90 tablet 3    amoxicillin (AMOXIL) 500 MG capsule Take 1 capsule by mouth. Prior to any dental procedure      Biotin 1000 MCG tablet Take 2,000 mcg by mouth Daily.      calcium carbonate (OS-TYRON) 600 MG tablet Take 1 tablet by mouth Daily.      lisinopril-hydrochlorothiazide (PRINZIDE,ZESTORETIC) 10-12.5 MG per tablet TAKE 1 TABLET DAILY. 90 tablet 3    Multiple Vitamin (multivitamin) capsule Take 1 capsule by mouth Daily.      potassium chloride (K-DUR,KLOR-CON) 20 MEQ CR tablet Take 1 tablet by mouth Daily.      Psyllium 28.3 % powder Take  by mouth Daily.      vitamin B-12 (CYANOCOBALAMIN) 100 MCG tablet Take 0.5 tablets by mouth Daily.      vitamin C (ASCORBIC ACID) 500 MG tablet Take 1 tablet by mouth Daily.      vitamin D3 125 MCG (5000 UT) capsule capsule Take 1 capsule by mouth Daily.       No current facility-administered medications for this visit.     Current Outpatient Medications on File Prior to Visit   Medication Sig    acalabrutinib (CALQUENCE) 100 MG capsule capsule Take 1 capsule by mouth Daily.    acetaminophen (TYLENOL) 325 MG tablet Take 2 tablets by mouth.    acyclovir (ZOVIRAX) 800 MG tablet Take 1 tablet by mouth Daily.    amLODIPine (NORVASC) 5 MG tablet Take 1 tablet by mouth Daily.    amoxicillin (AMOXIL) 500 MG capsule Take 1 capsule by mouth. Prior to any dental procedure    Biotin 1000 MCG tablet Take 2,000 mcg by mouth Daily.    calcium carbonate  "(OS-TYRON) 600 MG tablet Take 1 tablet by mouth Daily.    lisinopril-hydrochlorothiazide (PRINZIDE,ZESTORETIC) 10-12.5 MG per tablet TAKE 1 TABLET DAILY.    Multiple Vitamin (multivitamin) capsule Take 1 capsule by mouth Daily.    potassium chloride (K-DUR,KLOR-CON) 20 MEQ CR tablet Take 1 tablet by mouth Daily.    Psyllium 28.3 % powder Take  by mouth Daily.    vitamin B-12 (CYANOCOBALAMIN) 100 MCG tablet Take 0.5 tablets by mouth Daily.    vitamin C (ASCORBIC ACID) 500 MG tablet Take 1 tablet by mouth Daily.    vitamin D3 125 MCG (5000 UT) capsule capsule Take 1 capsule by mouth Daily.     No current facility-administered medications on file prior to visit.     She is allergic to amoxapine, brindall berry-chromium al, cinnamon, strawberry extract, and diphenhydramine hcl..    Review of Systems   Neurological:  Negative for dizziness, light-headedness and headaches.   Psychiatric/Behavioral:  Negative for confusion and decreased concentration.          Objective     Vitals:    11/07/23 1223   BP: 130/80   Pulse: 81   Resp: 16   Temp: 97.5 °F (36.4 °C)   SpO2: 100%   Weight: 47.1 kg (103 lb 12.8 oz)   Height: 162.6 cm (64.02\")     Body mass index is 17.81 kg/m².    Tobacco Use: Low Risk  (11/7/2023)    Patient History     Smoking Tobacco Use: Never     Smokeless Tobacco Use: Never     Passive Exposure: Not on file          Physical Exam  Vitals and nursing note reviewed.   Constitutional:       General: She is not in acute distress.     Appearance: She is normal weight.   HENT:      Head: Normocephalic.      Nose: Nose normal.      Mouth/Throat:      Mouth: Mucous membranes are moist.   Eyes:      Extraocular Movements: Extraocular movements intact.      Conjunctiva/sclera: Conjunctivae normal.   Cardiovascular:      Rate and Rhythm: Normal rate.   Pulmonary:      Effort: Pulmonary effort is normal.   Musculoskeletal:         General: Normal range of motion.      Cervical back: Normal range of motion.   Skin:     " General: Skin is warm and dry.   Neurological:      General: No focal deficit present.      Mental Status: She is alert and oriented to person, place, and time.      Cranial Nerves: No cranial nerve deficit.      Sensory: No sensory deficit.      Motor: No weakness.      Coordination: Coordination normal.      Gait: Gait normal.     Neurologic Exam     Mental Status   Oriented to person, place, and time.           Assessment & Plan   Independent Review of Radiographic Studies:      I personally reviewed the images from the following studies.    The MR angiogram of the head with and without contrast demonstrates 2 intracranial aneurysms 1 at the left MCA bifurcation measuring approximately 5 x 6 mm and the second involving the left A2 segment pericallosal callosal marginal artery bifurcation measuring 4 x 3 mm.  These appear relatively stable from the prior examination dated 10/30/2021.    Medical Decision Makin-year-old female with two intracranial aneurysms undergoing surveillance imaging with no overall change.  After discussing the risks, alternatives and procedure for endovascular treatment of aneurysms, she wishes to proceed in getting the larger aneurysm treated.  She has concern about an aneurysm rupture given her father's likely death as the result of subarachnoid hemorrhage.  Continued surveillance imaging of the smaller aneurysm will be ongoing.  The patient's MCA aneurysm is amenable to WEB occlusion which will be performed under general anesthesia in the near future.  She will need dual antiplatelet therapy in case of the need for a stent adjunct.  She is a non-smoker and reminded to remain tobacco free.  She does receive treatment for CLL and is to follow-up with her oncologist in January at which time she will discuss her aneurysm treatment and get on the schedule then.    Diagnoses and all orders for this visit:    1. Cerebral aneurysm without rupture (Primary)  -     IR Embolization;  Future  -     Basic Metabolic Panel; Future  -     CBC & Differential; Future  -     P2Y12 Platelet Inhibition; Future    2. Primary hypertension  -     IR Embolization; Future  -     Basic Metabolic Panel; Future  -     CBC & Differential; Future  -     P2Y12 Platelet Inhibition; Future    3. Chronic lymphoid leukemia  -     IR Embolization; Future  -     Basic Metabolic Panel; Future  -     CBC & Differential; Future  -     P2Y12 Platelet Inhibition; Future      Return in about 2 years (around 11/7/2025) for Recheck, MRA Head.  Cerebral embolization in January 2024 of the larger aneurysm.

## 2023-11-07 ENCOUNTER — OFFICE VISIT (OUTPATIENT)
Dept: NEUROSURGERY | Facility: CLINIC | Age: 73
End: 2023-11-07
Payer: MEDICARE

## 2023-11-07 VITALS
DIASTOLIC BLOOD PRESSURE: 80 MMHG | OXYGEN SATURATION: 100 % | SYSTOLIC BLOOD PRESSURE: 130 MMHG | HEIGHT: 64 IN | HEART RATE: 81 BPM | BODY MASS INDEX: 17.72 KG/M2 | WEIGHT: 103.8 LBS | RESPIRATION RATE: 16 BRPM | TEMPERATURE: 97.5 F

## 2023-11-07 DIAGNOSIS — C91.10 CHRONIC LYMPHOID LEUKEMIA: ICD-10-CM

## 2023-11-07 DIAGNOSIS — I10 PRIMARY HYPERTENSION: ICD-10-CM

## 2023-11-07 DIAGNOSIS — I67.1 CEREBRAL ANEURYSM WITHOUT RUPTURE: Primary | ICD-10-CM

## 2023-11-07 PROCEDURE — 3075F SYST BP GE 130 - 139MM HG: CPT | Performed by: RADIOLOGY

## 2023-11-07 PROCEDURE — 1160F RVW MEDS BY RX/DR IN RCRD: CPT | Performed by: RADIOLOGY

## 2023-11-07 PROCEDURE — 1159F MED LIST DOCD IN RCRD: CPT | Performed by: RADIOLOGY

## 2023-11-07 PROCEDURE — 99214 OFFICE O/P EST MOD 30 MIN: CPT | Performed by: RADIOLOGY

## 2023-11-07 PROCEDURE — 3079F DIAST BP 80-89 MM HG: CPT | Performed by: RADIOLOGY

## 2023-11-29 ENCOUNTER — TELEPHONE (OUTPATIENT)
Dept: NEUROSURGERY | Facility: CLINIC | Age: 73
End: 2023-11-29
Payer: MEDICARE

## 2023-11-29 NOTE — TELEPHONE ENCOUNTER
Patient called to cancel her cerebral embolization on January 26th with Dr. Meraz. She states she is not ready to have it done and will contact me to reschedule.

## 2024-01-03 RX ORDER — LISINOPRIL AND HYDROCHLOROTHIAZIDE 12.5; 1 MG/1; MG/1
1 TABLET ORAL DAILY
Qty: 90 TABLET | Refills: 3 | Status: SHIPPED | OUTPATIENT
Start: 2024-01-03

## 2024-01-03 RX ORDER — AMLODIPINE BESYLATE 5 MG/1
5 TABLET ORAL DAILY
Qty: 90 TABLET | Refills: 3 | Status: SHIPPED | OUTPATIENT
Start: 2024-01-03

## 2024-01-04 ENCOUNTER — HOSPITAL ENCOUNTER (OUTPATIENT)
Dept: CT IMAGING | Facility: HOSPITAL | Age: 74
Discharge: HOME | End: 2024-01-04
Admitting: INTERNAL MEDICINE
Payer: MEDICARE

## 2024-01-04 DIAGNOSIS — C91.00 LYMPHOBLASTIC LEUKEMIA: ICD-10-CM

## 2024-01-04 DIAGNOSIS — C91.10 LEUKEMIA, LYMPHOCYTIC, CHRONIC: ICD-10-CM

## 2024-01-04 LAB — CREAT BLDA-MCNC: 0.9 MG/DL (ref 0.6–1.3)

## 2024-01-04 PROCEDURE — 71260 CT THORAX DX C+: CPT

## 2024-01-04 PROCEDURE — 25510000001 IOPAMIDOL 61 % SOLUTION: Performed by: INTERNAL MEDICINE

## 2024-01-04 PROCEDURE — 74177 CT ABD & PELVIS W/CONTRAST: CPT

## 2024-01-04 PROCEDURE — 82565 ASSAY OF CREATININE: CPT

## 2024-01-04 RX ADMIN — IOPAMIDOL 85 ML: 612 INJECTION, SOLUTION INTRAVENOUS at 11:32

## 2024-04-15 ENCOUNTER — TELEPHONE (OUTPATIENT)
Dept: GASTROENTEROLOGY | Facility: CLINIC | Age: 74
End: 2024-04-15
Payer: MEDICARE

## 2024-04-15 NOTE — TELEPHONE ENCOUNTER
LAST C/S  4/12/21   IN EPIC     PERSONAL HX OF POLYPS    NO FAMILY HX OF POLYPS    NO FAMILY HX OF COLON CA            LIST OF  MEDICATIONS    ASPRIN   PLAVIX  LISINOPRIL   AMOXICILLIN   ACYCLOVIR  AMLODIPINE  LACTALBUMIN  ALENDRONATE  CLOPIDOGREL  BIOTIN   CALCIUM  CYANOCOBALAMIN   PSYLLIUM  ASCORBIC  CHOLECALCIFEROL  MULTI VITAMIN             OA QUESTIONNAIRE SCANNED IN MEDIA

## 2024-04-18 DIAGNOSIS — Z86.010 PERSONAL HISTORY OF COLONIC POLYPS: Primary | ICD-10-CM

## 2024-04-18 RX ORDER — SODIUM CHLORIDE, SODIUM LACTATE, POTASSIUM CHLORIDE, CALCIUM CHLORIDE 600; 310; 30; 20 MG/100ML; MG/100ML; MG/100ML; MG/100ML
30 INJECTION, SOLUTION INTRAVENOUS CONTINUOUS
OUTPATIENT
Start: 2024-04-18

## 2024-04-19 ENCOUNTER — TELEPHONE (OUTPATIENT)
Dept: GASTROENTEROLOGY | Facility: CLINIC | Age: 74
End: 2024-04-19
Payer: MEDICARE

## 2024-04-19 PROBLEM — Z86.010 PERSONAL HISTORY OF COLONIC POLYPS: Status: ACTIVE | Noted: 2024-04-18

## 2024-04-19 PROBLEM — Z86.0100 PERSONAL HISTORY OF COLONIC POLYPS: Status: ACTIVE | Noted: 2024-04-18

## 2024-04-19 NOTE — TELEPHONE ENCOUNTER
CHAR Padgett for COLONOSCOPY on 8/8/2024  arrive at 12:30  . Sent prep instructions to pt my chart....miralax

## 2024-07-11 ENCOUNTER — OFFICE VISIT (OUTPATIENT)
Age: 74
End: 2024-07-11
Payer: MEDICARE

## 2024-07-11 VITALS
HEIGHT: 64 IN | BODY MASS INDEX: 17.42 KG/M2 | WEIGHT: 102 LBS | SYSTOLIC BLOOD PRESSURE: 138 MMHG | HEART RATE: 67 BPM | DIASTOLIC BLOOD PRESSURE: 80 MMHG

## 2024-07-11 DIAGNOSIS — I10 PRIMARY HYPERTENSION: Primary | ICD-10-CM

## 2024-07-11 PROCEDURE — 3075F SYST BP GE 130 - 139MM HG: CPT | Performed by: INTERNAL MEDICINE

## 2024-07-11 PROCEDURE — 3079F DIAST BP 80-89 MM HG: CPT | Performed by: INTERNAL MEDICINE

## 2024-07-11 PROCEDURE — 93000 ELECTROCARDIOGRAM COMPLETE: CPT | Performed by: INTERNAL MEDICINE

## 2024-07-11 PROCEDURE — 99213 OFFICE O/P EST LOW 20 MIN: CPT | Performed by: INTERNAL MEDICINE

## 2024-07-11 RX ORDER — ASPIRIN 81 MG/1
81 TABLET ORAL DAILY
COMMUNITY
Start: 2024-01-18

## 2024-07-11 RX ORDER — ALENDRONATE SODIUM 70 MG/1
70 TABLET ORAL
COMMUNITY
Start: 2024-06-11

## 2024-07-11 RX ORDER — CLOPIDOGREL BISULFATE 75 MG/1
75 TABLET ORAL DAILY
COMMUNITY
Start: 2024-01-18

## 2024-07-11 NOTE — PROGRESS NOTES
Date of Office Visit: 24    Encounter Provider: Lance Marie MD  Place of Service: [facility] CARDIOLOGY  Patient Name: Abigail Rizzo  :1950    Chief complaint  Essential hypertension    HPI: Abigail Rizzo is a 73 y.o. female.  She has a medical history of essential hypertension.  Echocardiogram from 2020 demonstrated normal LV function, mild LVH, and no significant valvular abnormalities.  She is currently on amlodipine 5 mg p.o. daily along with lisinopril-HCTZ 10-12.5 mg p.o. daily.  She seems to be tolerating this well.  Her labs been reviewed.  Since her last visit she has been doing well.  She denies any new complaints.  She does have mild right lower extremity edema that she notices improves with elevation.  She denies any orthopnea or PND.  She follows at [facility] for CLL.  Of note her uric acid has been in the upper level of normal for a while but her last check was normal.  She denies any issues with gout.    Past Medical History:   Diagnosis Date    Aneurysm     Cerebral aneurysm     CLL (chronic lymphocytic leukemia)     /SLL    CLL (chronic lymphocytic leukemia)     H/O Broken shoulder     S/P seizure while on Asendin.    H/O Foot deformities     Various foot deformities corrected from     H/O Ovarian cysts     Hypertension     Injury of back     Lumbosacral disc disease     PONV (postoperative nausea and vomiting)     Scoliosis     Gives back pain       Past Surgical History:   Procedure Laterality Date    CEREBRAL ANEURYSM REPAIR      MCA ANEURYSM EMBOLIZATION WITH STENT ASSISTED COILING    COLONOSCOPY N/A 2021    Procedure: COLONOSCOPY TO CECUM WITH COLD SNARE POLYPECTOMIES WITH CLIP TO RECTOSIGMOID SITE;  Surgeon: Malick Rosales MD;  Location: [facility] ENDOSCOPY;  Service: Gastroenterology;  Laterality: N/A;  PRE:Positive cologuard  POST:POLYPS    EPIDURAL BLOCK      FOOT SURGERY       to  various foot deformities corrected     NASAL SEPTUM SURGERY      OOPHORECTOMY Left 1992    and part of right    SHOULDER SURGERY Left 1989    Replacement of the left shoulder in 1989 in [place], [place] because of osteoarthitis, trauma, and pain    SHOULDER SURGERY Left 2005    Shoulder revision       Social History     Socioeconomic History    Marital status:      Spouse name: Denilson    Number of children: 0    Years of education: College   Tobacco Use    Smoking status: Never    Smokeless tobacco: Never   Vaping Use    Vaping status: Never Used   Substance and Sexual Activity    Alcohol use: Yes     Alcohol/week: 1.0 standard drink of alcohol     Types: 1 Glasses of wine per week     Comment: Occasional    Drug use: No    Sexual activity: Defer       Family History   Problem Relation Age of Onset    Leukemia Mother 85        CLL    Allergy (severe) Mother     Hypertension Mother     Skin cancer Mother 16    Thrombosis Father         Cerebral thrombosis    Bleeding Disorder Sister     Cervical cancer Sister 49       Review of Systems   Constitutional: Negative. Negative for fever and malaise/fatigue.   HENT:  Negative for nosebleeds and sore throat.    Eyes:  Negative for blurred vision and double vision.   Cardiovascular: Negative.  Negative for chest pain, claudication, dyspnea on exertion, leg swelling, orthopnea, palpitations, paroxysmal nocturnal dyspnea and syncope.   Respiratory: Negative.  Negative for cough, shortness of breath and snoring.    Endocrine: Negative for cold intolerance, heat intolerance and polydipsia.   Hematologic/Lymphatic: Negative for bleeding problem.   Skin:  Negative for itching, poor wound healing and rash.   Musculoskeletal:  Negative for falls, joint pain, joint swelling, muscle weakness and myalgias.   Gastrointestinal:  Negative for abdominal pain, melena, nausea and vomiting.   Neurological:  Negative for dizziness, light-headedness, loss of balance, seizures, vertigo and weakness.  "  Psychiatric/Behavioral:  Negative for altered mental status and depression.        Allergies   Allergen Reactions    Amoxapine Other (See Comments)     Passed out  01/23/2006-ASENDIN seizure  Passed out      Brindall Salmeron-Chromium Melissa Unknown - Low Severity     \"burns mouth\" (strawberry)     Cinnamon Unknown - Low Severity     \"Burns mouth\"  \"Burns mouth\"    Strawberry Extract Other (See Comments)     \"burns mouth\"    Diphenhydramine Hcl Other (See Comments)     Redness,  Pt prefers not to take         Current Outpatient Medications:     acalabrutinib (CALQUENCE) 100 MG capsule capsule, Take 1 capsule by mouth Daily., Disp: , Rfl:     acetaminophen (TYLENOL) 325 MG tablet, Take 2 tablets by mouth., Disp: , Rfl:     acyclovir (ZOVIRAX) 800 MG tablet, Take 1 tablet by mouth Daily., Disp: , Rfl:     alendronate (FOSAMAX) 70 MG tablet, Take 1 tablet by mouth Every 7 (Seven) Days., Disp: , Rfl:     amLODIPine (NORVASC) 5 MG tablet, Take 1 tablet by mouth Daily., Disp: 90 tablet, Rfl: 3    amoxicillin (AMOXIL) 500 MG capsule, Take 1 capsule by mouth. Prior to any dental procedure, Disp: , Rfl:     aspirin 81 MG EC tablet, Take 1 tablet by mouth Daily., Disp: , Rfl:     Biotin 1000 MCG tablet, Take 2,000 mcg by mouth Daily., Disp: , Rfl:     calcium carbonate (OS-TYRON) 600 MG tablet, Take 1 tablet by mouth Daily., Disp: , Rfl:     clopidogrel (PLAVIX) 75 MG tablet, Take 1 tablet by mouth Daily., Disp: , Rfl:     lisinopril-hydrochlorothiazide (PRINZIDE,ZESTORETIC) 10-12.5 MG per tablet, Take 1 tablet by mouth Daily., Disp: 90 tablet, Rfl: 3    Multiple Vitamin (multivitamin) capsule, Take 1 capsule by mouth Daily., Disp: , Rfl:     Psyllium 28.3 % powder, Take  by mouth Daily., Disp: , Rfl:     vitamin B-12 (CYANOCOBALAMIN) 100 MCG tablet, Take 0.5 tablets by mouth Daily., Disp: , Rfl:     vitamin C (ASCORBIC ACID) 500 MG tablet, Take 1 tablet by mouth Daily., Disp: , Rfl:     vitamin D3 125 MCG (5000 UT) capsule " "capsule, Take 1 capsule by mouth Daily., Disp: , Rfl:       Objective:     Vitals:    07/11/24 1119   BP: 138/80   Pulse: 67   Weight: 46.3 kg (102 lb)   Height: 162.6 cm (64\")       Body mass index is 17.51 kg/m².    PHYSICAL EXAM:    Constitutional:       Appearance: Well-developed.   Eyes:      General: No scleral icterus.     Conjunctiva/sclera: Conjunctivae normal.   HENT:      Head: Normocephalic and atraumatic.   Neck:      Thyroid: No thyromegaly.      Vascular: Normal carotid pulses. No carotid bruit, hepatojugular reflux or JVD.      Trachea: No tracheal deviation.   Pulmonary:      Effort: Pulmonary effort is normal. No respiratory distress.      Breath sounds: Normal breath sounds. No decreased breath sounds. No wheezing. No rhonchi. No rales.   Chest:      Chest wall: Not tender to palpatation.   Cardiovascular:      Normal rate. Regular rhythm.      Murmurs: There is no murmur.      No gallop.  No click. No rub.   Pulses:     Intact distal pulses.      Carotid: 2+ bilaterally.     Radial: 2+ bilaterally.     Femoral: 2+ bilaterally.     Dorsalis pedis: 2+ bilaterally.     Posterior tibial: 2+ bilaterally.  Abdominal:      General: Bowel sounds are normal. There is no distension.      Palpations: Abdomen is soft.      Tenderness: There is no abdominal tenderness.   Musculoskeletal:         General: No deformity.      Cervical back: Normal range of motion and neck supple. Skin:     Findings: No erythema or rash.   Neurological:      Mental Status: Alert and oriented to person, place, and time.      Sensory: No sensory deficit.   Psychiatric:         Behavior: Behavior normal.           ECG 12 Lead    Date/Time: 7/11/2024 11:54 AM  Performed by: Lance Marie MD    Authorized by: Lance Marie MD  Comparison: compared with previous ECG from 2/28/2024  Similar to previous ECG  Rhythm: sinus rhythm  Rate: normal  QRS axis: normal    Clinical impression: normal ECG            Assessment:    "   Plan:   73-year-old female with medical history of essential hypertension, CLL, scoliosis, who presents to me in follow-up for her essential hypertension.  She is doing very well as of late and her blood pressure is well-controlled.  Her uric acid has been in the upper level of normal which she has not had any issues with gout.  She is on a very low-dose of HCTZ.  She does have mild right lower extremity edema which she has reported, but this looks good today.  History is consistent with venous insufficiency.    1.  Essential hypertension: Blood pressure reasonably well controlled.  No changes indicated.  - Continue lisinopril-HCTZ therapy at current dose.  - Labs have been reviewed.  No significant issues with hyperkalemia or renal insufficiency on this therapy.    I will see her back in 1 year.

## 2024-08-08 ENCOUNTER — ANESTHESIA (OUTPATIENT)
Dept: GASTROENTEROLOGY | Facility: HOSPITAL | Age: 74
End: 2024-08-08
Payer: MEDICARE

## 2024-08-08 ENCOUNTER — HOSPITAL ENCOUNTER (OUTPATIENT)
Facility: HOSPITAL | Age: 74
Setting detail: HOSPITAL OUTPATIENT SURGERY
Discharge: HOME | End: 2024-08-08
Attending: INTERNAL MEDICINE | Admitting: INTERNAL MEDICINE
Payer: MEDICARE

## 2024-08-08 ENCOUNTER — ANESTHESIA EVENT (OUTPATIENT)
Dept: GASTROENTEROLOGY | Facility: HOSPITAL | Age: 74
End: 2024-08-08
Payer: MEDICARE

## 2024-08-08 VITALS
RESPIRATION RATE: 16 BRPM | HEIGHT: 64 IN | WEIGHT: 99 LBS | HEART RATE: 64 BPM | SYSTOLIC BLOOD PRESSURE: 137 MMHG | OXYGEN SATURATION: 95 % | DIASTOLIC BLOOD PRESSURE: 69 MMHG | BODY MASS INDEX: 16.9 KG/M2

## 2024-08-08 DIAGNOSIS — Z86.010 PERSONAL HISTORY OF COLONIC POLYPS: ICD-10-CM

## 2024-08-08 PROCEDURE — 45380 COLONOSCOPY AND BIOPSY: CPT | Performed by: INTERNAL MEDICINE

## 2024-08-08 PROCEDURE — 25810000003 LACTATED RINGERS PER 1000 ML: Performed by: NURSE ANESTHETIST, CERTIFIED REGISTERED

## 2024-08-08 PROCEDURE — 25010000002 PROPOFOL 10 MG/ML EMULSION: Performed by: NURSE ANESTHETIST, CERTIFIED REGISTERED

## 2024-08-08 PROCEDURE — 88305 TISSUE EXAM BY PATHOLOGIST: CPT | Performed by: INTERNAL MEDICINE

## 2024-08-08 PROCEDURE — 25810000003 LACTATED RINGERS PER 1000 ML: Performed by: INTERNAL MEDICINE

## 2024-08-08 RX ORDER — PROMETHAZINE HYDROCHLORIDE 25 MG/1
25 TABLET ORAL ONCE AS NEEDED
Status: DISCONTINUED | OUTPATIENT
Start: 2024-08-08 | End: 2024-08-08 | Stop reason: HOSPADM

## 2024-08-08 RX ORDER — SODIUM CHLORIDE, SODIUM LACTATE, POTASSIUM CHLORIDE, CALCIUM CHLORIDE 600; 310; 30; 20 MG/100ML; MG/100ML; MG/100ML; MG/100ML
INJECTION, SOLUTION INTRAVENOUS CONTINUOUS PRN
Status: DISCONTINUED | OUTPATIENT
Start: 2024-08-08 | End: 2024-08-08 | Stop reason: SURG

## 2024-08-08 RX ORDER — LIDOCAINE HYDROCHLORIDE 20 MG/ML
INJECTION, SOLUTION INFILTRATION; PERINEURAL AS NEEDED
Status: DISCONTINUED | OUTPATIENT
Start: 2024-08-08 | End: 2024-08-08 | Stop reason: SURG

## 2024-08-08 RX ORDER — HYDRALAZINE HYDROCHLORIDE 20 MG/ML
5 INJECTION INTRAMUSCULAR; INTRAVENOUS
Status: DISCONTINUED | OUTPATIENT
Start: 2024-08-08 | End: 2024-08-08 | Stop reason: HOSPADM

## 2024-08-08 RX ORDER — FLUMAZENIL 0.1 MG/ML
0.2 INJECTION INTRAVENOUS AS NEEDED
Status: DISCONTINUED | OUTPATIENT
Start: 2024-08-08 | End: 2024-08-08 | Stop reason: HOSPADM

## 2024-08-08 RX ORDER — DROPERIDOL 2.5 MG/ML
0.62 INJECTION, SOLUTION INTRAMUSCULAR; INTRAVENOUS
Status: DISCONTINUED | OUTPATIENT
Start: 2024-08-08 | End: 2024-08-08 | Stop reason: HOSPADM

## 2024-08-08 RX ORDER — OXYCODONE AND ACETAMINOPHEN 7.5; 325 MG/1; MG/1
1 TABLET ORAL EVERY 4 HOURS PRN
Status: DISCONTINUED | OUTPATIENT
Start: 2024-08-08 | End: 2024-08-08 | Stop reason: HOSPADM

## 2024-08-08 RX ORDER — EPHEDRINE SULFATE 50 MG/ML
5 INJECTION, SOLUTION INTRAVENOUS ONCE AS NEEDED
Status: DISCONTINUED | OUTPATIENT
Start: 2024-08-08 | End: 2024-08-08 | Stop reason: HOSPADM

## 2024-08-08 RX ORDER — ONDANSETRON 2 MG/ML
4 INJECTION INTRAMUSCULAR; INTRAVENOUS ONCE AS NEEDED
Status: DISCONTINUED | OUTPATIENT
Start: 2024-08-08 | End: 2024-08-08 | Stop reason: HOSPADM

## 2024-08-08 RX ORDER — HYDROMORPHONE HYDROCHLORIDE 2 MG/ML
0.5 INJECTION, SOLUTION INTRAMUSCULAR; INTRAVENOUS; SUBCUTANEOUS
Status: DISCONTINUED | OUTPATIENT
Start: 2024-08-08 | End: 2024-08-08 | Stop reason: HOSPADM

## 2024-08-08 RX ORDER — SODIUM CHLORIDE, SODIUM LACTATE, POTASSIUM CHLORIDE, CALCIUM CHLORIDE 600; 310; 30; 20 MG/100ML; MG/100ML; MG/100ML; MG/100ML
30 INJECTION, SOLUTION INTRAVENOUS CONTINUOUS
Status: DISCONTINUED | OUTPATIENT
Start: 2024-08-08 | End: 2024-08-08 | Stop reason: HOSPADM

## 2024-08-08 RX ORDER — PROMETHAZINE HYDROCHLORIDE 25 MG/1
25 SUPPOSITORY RECTAL ONCE AS NEEDED
Status: DISCONTINUED | OUTPATIENT
Start: 2024-08-08 | End: 2024-08-08 | Stop reason: HOSPADM

## 2024-08-08 RX ORDER — FENTANYL CITRATE 50 UG/ML
50 INJECTION, SOLUTION INTRAMUSCULAR; INTRAVENOUS
Status: DISCONTINUED | OUTPATIENT
Start: 2024-08-08 | End: 2024-08-08 | Stop reason: HOSPADM

## 2024-08-08 RX ORDER — HYDROCODONE BITARTRATE AND ACETAMINOPHEN 5; 325 MG/1; MG/1
1 TABLET ORAL ONCE AS NEEDED
Status: DISCONTINUED | OUTPATIENT
Start: 2024-08-08 | End: 2024-08-08 | Stop reason: HOSPADM

## 2024-08-08 RX ORDER — NALOXONE HCL 0.4 MG/ML
0.2 VIAL (ML) INJECTION AS NEEDED
Status: DISCONTINUED | OUTPATIENT
Start: 2024-08-08 | End: 2024-08-08 | Stop reason: HOSPADM

## 2024-08-08 RX ORDER — PROPOFOL 10 MG/ML
VIAL (ML) INTRAVENOUS AS NEEDED
Status: DISCONTINUED | OUTPATIENT
Start: 2024-08-08 | End: 2024-08-08 | Stop reason: SURG

## 2024-08-08 RX ORDER — LABETALOL HYDROCHLORIDE 5 MG/ML
5 INJECTION, SOLUTION INTRAVENOUS
Status: DISCONTINUED | OUTPATIENT
Start: 2024-08-08 | End: 2024-08-08 | Stop reason: HOSPADM

## 2024-08-08 RX ORDER — IPRATROPIUM BROMIDE AND ALBUTEROL SULFATE 2.5; .5 MG/3ML; MG/3ML
3 SOLUTION RESPIRATORY (INHALATION) ONCE AS NEEDED
Status: DISCONTINUED | OUTPATIENT
Start: 2024-08-08 | End: 2024-08-08 | Stop reason: HOSPADM

## 2024-08-08 RX ADMIN — LIDOCAINE HYDROCHLORIDE 60 MG: 20 INJECTION, SOLUTION INFILTRATION; PERINEURAL at 12:42

## 2024-08-08 RX ADMIN — PROPOFOL 180 MCG/KG/MIN: 10 INJECTION, EMULSION INTRAVENOUS at 12:43

## 2024-08-08 RX ADMIN — PROPOFOL 100 MG: 10 INJECTION, EMULSION INTRAVENOUS at 12:42

## 2024-08-08 RX ADMIN — SODIUM CHLORIDE, POTASSIUM CHLORIDE, SODIUM LACTATE AND CALCIUM CHLORIDE: 600; 310; 30; 20 INJECTION, SOLUTION INTRAVENOUS at 12:42

## 2024-08-08 RX ADMIN — SODIUM CHLORIDE, POTASSIUM CHLORIDE, SODIUM LACTATE AND CALCIUM CHLORIDE 30 ML/HR: 600; 310; 30; 20 INJECTION, SOLUTION INTRAVENOUS at 12:33

## 2024-08-08 NOTE — ANESTHESIA POSTPROCEDURE EVALUATION
Patient: Abigail Rizzo    Procedure Summary       Date: 08/08/24 Room / Location: [facility] ENDOSCOPY 10 / [facility] ENDOSCOPY    Anesthesia Start: 1240 Anesthesia Stop: 1259    Procedure: COLONOSCOPY to cecum WITH cold BIOPSY POLYPECTOMY Diagnosis:       Personal history of colonic polyps      (Personal history of colonic polyps [Z86.010])    Surgeons: Malick Rosales MD Provider: Lance Echeverria MD    Anesthesia Type: MAC ASA Status: 2            Anesthesia Type: MAC    Vitals  Vitals Value Taken Time   /61 08/08/24 1259   Temp     Pulse 66 08/08/24 1309   Resp 16 08/08/24 1258   SpO2 89 % 08/08/24 1309   Vitals shown include unfiled device data.        Post Anesthesia Care and Evaluation    Patient location during evaluation: PACU  Patient participation: complete - patient participated  Level of consciousness: awake and alert  Pain management: adequate    Airway patency: patent  Anesthetic complications: No anesthetic complications    Cardiovascular status: acceptable  Respiratory status: acceptable  Hydration status: acceptable    Comments: --------------------            08/08/24               1258     --------------------   BP:       110/61     Pulse:      73       Resp:       16       SpO2:      98%      --------------------

## 2024-08-08 NOTE — ANESTHESIA PREPROCEDURE EVALUATION
Anesthesia Evaluation     history of anesthetic complications:  PONV               Airway   Mallampati: II  Dental      Pulmonary    Cardiovascular     ECG reviewed  Rhythm: regular  Rate: normal    (+) hypertension      Neuro/Psych  GI/Hepatic/Renal/Endo      Musculoskeletal     Abdominal    Substance History   (+) alcohol use     OB/GYN          Other   blood dyscrasia (CLL),   history of cancer (CLL) active                Anesthesia Plan    ASA 2     MAC     intravenous induction     Anesthetic plan, risks, benefits, and alternatives have been provided, discussed and informed consent has been obtained with: patient.    CODE STATUS:

## 2024-08-08 NOTE — DISCHARGE INSTRUCTIONS
For the next 24 hours patient needs to be with a responsible adult.    For 24 hours DO NOT drive, operate machinery, appliances, drink alcohol, make important decisions or sign legal documents.    Start with a light or bland diet if you are feeling sick to your stomach otherwise advance to regular diet as tolerated.    Follow recommendations on procedure report if provided by your doctor.    Call Dr Rosales  for problems [phone]    Problems may include but not limited to: large amounts of bleeding, trouble breathing, repeated vomiting, severe unrelieved pain, fever or chills.

## 2024-08-08 NOTE — H&P
[facility]  Pre Procedure History & Physical    Chief Complaint:   Time for my colonoscopy    Subjective     HPI:   74 y.o. female presenting to endoscopy unit today for surveillance colonoscopy.    Past Medical History:   Past Medical History:   Diagnosis Date    Aneurysm     Cerebral aneurysm     CLL (chronic lymphocytic leukemia)     /SLL    CLL (chronic lymphocytic leukemia)     H/O Broken shoulder     S/P seizure while on Asendin.    H/O Foot deformities     Various foot deformities corrected from 1979    H/O Ovarian cysts     Hypertension     Injury of back     Lumbosacral disc disease     PONV (postoperative nausea and vomiting)     Scoliosis     Gives back pain       Family History:  Family History   Problem Relation Age of Onset    Leukemia Mother 85        CLL    Allergy (severe) Mother     Hypertension Mother     Skin cancer Mother 16    Thrombosis Father         Cerebral thrombosis    Bleeding Disorder Sister     Cervical cancer Sister 49    Malig Hyperthermia Neg Hx        Social History:   reports that she has never smoked. She has never used smokeless tobacco. She reports current alcohol use of about 1.0 standard drink of alcohol per week. She reports that she does not use drugs.    Medications:   Medications Prior to Admission   Medication Sig Dispense Refill Last Dose    acalabrutinib (CALQUENCE) 100 MG capsule capsule Take 1 capsule by mouth Daily.       acetaminophen (TYLENOL) 325 MG tablet Take 2 tablets by mouth.       acyclovir (ZOVIRAX) 800 MG tablet Take 1 tablet by mouth Daily.       alendronate (FOSAMAX) 70 MG tablet Take 1 tablet by mouth Every 7 (Seven) Days.       amLODIPine (NORVASC) 5 MG tablet Take 1 tablet by mouth Daily. 90 tablet 3     amoxicillin (AMOXIL) 500 MG capsule Take 1 capsule by mouth. Prior to any dental procedure       aspirin 81 MG EC tablet Take 1 tablet by mouth Daily.       Biotin 1000 MCG tablet Take 2,000 mcg by mouth Daily.       calcium  carbonate (OS-TYRON) 600 MG tablet Take 1 tablet by mouth Daily.       lisinopril-hydrochlorothiazide (PRINZIDE,ZESTORETIC) 10-12.5 MG per tablet Take 1 tablet by mouth Daily. 90 tablet 3     Multiple Vitamin (multivitamin) capsule Take 1 capsule by mouth Daily.       Psyllium 28.3 % powder Take  by mouth Daily.       vitamin B-12 (CYANOCOBALAMIN) 100 MCG tablet Take 0.5 tablets by mouth Daily.       vitamin C (ASCORBIC ACID) 500 MG tablet Take 1 tablet by mouth Daily.       vitamin D3 125 MCG (5000 UT) capsule capsule Take 1 capsule by mouth Daily.       clopidogrel (PLAVIX) 75 MG tablet Take 1 tablet by mouth Daily. LD 8/2          Allergies:  Amoxapine, Brindall berry-chromium al, Cinnamon, Strawberry extract, and Diphenhydramine hcl    Objective     There were no vitals taken for this visit.  Physical Exam:   General: patient awake, alert and cooperative    Assessment & Plan     Diagnosis:  Personal hx of colon polyps    Anticipated Surgical Procedure:  Colonoscopy    The risks, benefits, and alternatives of this procedure have been discussed with the patient or the responsible party- the patient understands and agrees to proceed.

## 2024-08-09 LAB
CYTO UR: NORMAL
LAB AP CASE REPORT: NORMAL
PATH REPORT.FINAL DX SPEC: NORMAL
PATH REPORT.GROSS SPEC: NORMAL

## 2024-12-16 RX ORDER — LISINOPRIL AND HYDROCHLOROTHIAZIDE 10; 12.5 MG/1; MG/1
1 TABLET ORAL DAILY
Qty: 90 TABLET | Refills: 2 | Status: SHIPPED | OUTPATIENT
Start: 2024-12-16

## 2024-12-16 RX ORDER — AMLODIPINE BESYLATE 5 MG/1
5 TABLET ORAL DAILY
Qty: 90 TABLET | Refills: 2 | Status: SHIPPED | OUTPATIENT
Start: 2024-12-16

## 2025-03-16 ENCOUNTER — APPOINTMENT (OUTPATIENT)
Dept: CT IMAGING | Facility: HOSPITAL | Age: 75
End: 2025-03-16
Payer: MEDICARE

## 2025-03-16 ENCOUNTER — HOSPITAL ENCOUNTER (EMERGENCY)
Facility: HOSPITAL | Age: 75
Discharge: HOME | End: 2025-03-16
Attending: EMERGENCY MEDICINE | Admitting: EMERGENCY MEDICINE
Payer: MEDICARE

## 2025-03-16 VITALS
DIASTOLIC BLOOD PRESSURE: 97 MMHG | SYSTOLIC BLOOD PRESSURE: 168 MMHG | OXYGEN SATURATION: 98 % | RESPIRATION RATE: 14 BRPM | HEART RATE: 76 BPM | TEMPERATURE: 97.8 F

## 2025-03-16 DIAGNOSIS — M54.50 LOW BACK PAIN WITHOUT SCIATICA, UNSPECIFIED BACK PAIN LATERALITY, UNSPECIFIED CHRONICITY: ICD-10-CM

## 2025-03-16 DIAGNOSIS — S22.41XA CLOSED FRACTURE OF MULTIPLE RIBS OF RIGHT SIDE, INITIAL ENCOUNTER: Primary | ICD-10-CM

## 2025-03-16 DIAGNOSIS — M41.9 SCOLIOSIS OF THORACOLUMBAR SPINE, UNSPECIFIED SCOLIOSIS TYPE: ICD-10-CM

## 2025-03-16 LAB
BACTERIA UR QL AUTO: NORMAL /HPF
BILIRUB UR QL STRIP: NEGATIVE
CLARITY UR: CLEAR
COLOR UR: YELLOW
GLUCOSE UR STRIP-MCNC: NEGATIVE MG/DL
HGB UR QL STRIP.AUTO: ABNORMAL
HYALINE CASTS UR QL AUTO: NORMAL /LPF
KETONES UR QL STRIP: NEGATIVE
LEUKOCYTE ESTERASE UR QL STRIP.AUTO: NEGATIVE
NITRITE UR QL STRIP: NEGATIVE
PH UR STRIP.AUTO: 5.5 [PH] (ref 5–8)
PROT UR QL STRIP: NEGATIVE
RBC # UR STRIP: NORMAL /HPF
REF LAB TEST METHOD: NORMAL
SP GR UR STRIP: 1.01 (ref 1–1.03)
SQUAMOUS #/AREA URNS HPF: NORMAL /HPF
UROBILINOGEN UR QL STRIP: ABNORMAL
WBC # UR STRIP: NORMAL /HPF

## 2025-03-16 PROCEDURE — 81001 URINALYSIS AUTO W/SCOPE: CPT | Performed by: EMERGENCY MEDICINE

## 2025-03-16 PROCEDURE — 74176 CT ABD & PELVIS W/O CONTRAST: CPT

## 2025-03-16 PROCEDURE — 99284 EMERGENCY DEPT VISIT MOD MDM: CPT

## 2025-03-16 PROCEDURE — 72131 CT LUMBAR SPINE W/O DYE: CPT

## 2025-03-16 PROCEDURE — 72128 CT CHEST SPINE W/O DYE: CPT

## 2025-03-16 RX ORDER — HYDROCODONE BITARTRATE AND ACETAMINOPHEN 5; 325 MG/1; MG/1
1 TABLET ORAL ONCE
Refills: 0 | Status: COMPLETED | OUTPATIENT
Start: 2025-03-16 | End: 2025-03-16

## 2025-03-16 RX ORDER — HYDROCODONE BITARTRATE AND ACETAMINOPHEN 5; 325 MG/1; MG/1
1 TABLET ORAL EVERY 6 HOURS PRN
Qty: 12 TABLET | Refills: 0 | Status: SHIPPED | OUTPATIENT
Start: 2025-03-16 | End: 2025-03-19

## 2025-03-16 RX ADMIN — HYDROCODONE BITARTRATE AND ACETAMINOPHEN 1 TABLET: 5; 325 TABLET ORAL at 17:31

## 2025-03-16 NOTE — ED PROVIDER NOTES
EMERGENCY DEPARTMENT ENCOUNTER  Room Number:  39/39  PCP: Wilmar Marin MD  Independent Historians: Patient      HPI:  Chief Complaint: had concerns including Back Pain.     A complete HPI/ROS/PMH/PSH/SH/FH are unobtainable due to: None    Chronic or social conditions impacting patient care (Social Determinants of Health): None      Context: Abigail Rizzo is a 74 y.o. female with a medical history of scoliosis, CLL, hypertension and cerebral aneurysm who presents to the ED c/o acute severe back pain in the mid back and lower back radiating towards the right side.  She was recently seen in orthopedics office this past week for evaluation of the pain.  She is awaiting outpatient MRI study to be completed.  However, today the pain was escalating and becoming much more severe and she had difficulties tolerating the pain.  She denies any falls or injuries.  Denies fevers.    Review of prior external notes (non-ED) -and- Review of prior external test results outside of this encounter: I independently reviewed the DEXA bone density scan results from March 14, 2025.  Impression was consistent with osteopenia.  I also independently reviewed the orthopedic surgery progress note from March 13, 2025.  Patient had consultation for back pain symptoms at that time.  She had x-ray imaging of the spine which was inconclusive.  MRI thoracic and lumbar spine without contrast was ordered to be conducted in the future.    Prescription drug monitoring program review: MARIA M reviewed by Basil Christianson MD   N/A and MARIA M query complete and reviewed. Treatment plan to include limited course of prescribed controlled substance. Risks including addiction, benefits, and alternatives presented to patient.    PAST MEDICAL HISTORY  Active Ambulatory Problems     Diagnosis Date Noted    Chronic lymphoid leukemia 02/26/2016    Cerebral aneurysm without rupture 06/12/2018    Hypertension     Positive colorectal cancer screening using Cologuard  test 03/05/2021    Personal history of colonic polyps 04/18/2024     Resolved Ambulatory Problems     Diagnosis Date Noted    No Resolved Ambulatory Problems     Past Medical History:   Diagnosis Date    Aneurysm     Cerebral aneurysm     CLL (chronic lymphocytic leukemia)     CLL (chronic lymphocytic leukemia)     H/O Broken shoulder     H/O Foot deformities     H/O Ovarian cysts     Injury of back     Lumbosacral disc disease     PONV (postoperative nausea and vomiting)     Scoliosis          PAST SURGICAL HISTORY  Past Surgical History:   Procedure Laterality Date    CEREBRAL ANEURYSM REPAIR      MCA ANEURYSM EMBOLIZATION WITH STENT ASSISTED COILING    COLONOSCOPY N/A 04/12/2021    Procedure: COLONOSCOPY TO CECUM WITH COLD SNARE POLYPECTOMIES WITH CLIP TO RECTOSIGMOID SITE;  Surgeon: Malick Rosales MD;  Location: [facility] ENDOSCOPY;  Service: Gastroenterology;  Laterality: N/A;  PRE:Positive cologuard  POST:POLYPS    COLONOSCOPY W/ POLYPECTOMY N/A 8/8/2024    Procedure: COLONOSCOPY to cecum WITH cold BIOPSY POLYPECTOMY;  Surgeon: Malick Rosales MD;  Location: [facility] ENDOSCOPY;  Service: Gastroenterology;  Laterality: N/A;  pre: h/o colon polyps  post: polyp    EPIDURAL BLOCK      FOOT SURGERY      1979 to 2000 various foot deformities corrected    NASAL SEPTUM SURGERY      OOPHORECTOMY Left 1992    and part of right    SHOULDER SURGERY Left 1989    Replacement of the left shoulder in 1989 in [place], [place] because of osteoarthitis, trauma, and pain    SHOULDER SURGERY Left 2005    Shoulder revision         FAMILY HISTORY  Family History   Problem Relation Age of Onset    Leukemia Mother 85        CLL    Allergy (severe) Mother     Hypertension Mother     Skin cancer Mother 16    Thrombosis Father         Cerebral thrombosis    Bleeding Disorder Sister     Cervical cancer Sister 49    Malig Hyperthermia Neg Hx          SOCIAL HISTORY  Social History     Socioeconomic History    Marital status:       Spouse name: Denilson    Number of children: 0    Years of education: College   Tobacco Use    Smoking status: Never    Smokeless tobacco: Never   Vaping Use    Vaping status: Never Used   Substance and Sexual Activity    Alcohol use: Yes     Alcohol/week: 1.0 standard drink of alcohol     Types: 1 Glasses of wine per week     Comment: Occasional    Drug use: No    Sexual activity: Defer         ALLERGIES  Amoxapine, Brindall berry-chromium al, Cinnamon, Strawberry extract, and Diphenhydramine hcl      REVIEW OF SYSTEMS  Review of Systems  Included in HPI  All systems reviewed and negative except for those discussed in HPI.      PHYSICAL EXAM    I have reviewed the triage vital signs and nursing notes.    ED Triage Vitals   Temp Heart Rate Resp BP SpO2   03/16/25 1338 03/16/25 1338 03/16/25 1338 03/16/25 1341 03/16/25 1338   97.8 °F (36.6 °C) 71 18 139/75 98 %      Temp src Heart Rate Source Patient Position BP Location FiO2 (%)   -- -- -- -- --              Physical Exam  GENERAL: alert, no acute distress  SKIN: Warm, dry, no rashes  HENT: Normocephalic, atraumatic  EYES: no scleral icterus, normal conjunctivae  CV: regular rhythm, regular rate, no murmurs, normal pulses  RESPIRATORY: normal effort, lungs clear bilaterally  ABDOMEN: soft, nondistended, nontender  MUSCULOSKELETAL: There is moderate tenderness rather diffusely along the thoracic and lumbosacral back soft tissues.  There is obvious scoliosis deformity and a bulging density noted to the right lumbar / flank area which is tender.  NEURO: alert, moves all extremities, follows commands, no focal motor deficits are present.      LAB RESULTS  Recent Results (from the past 24 hours)   Urinalysis With Culture If Indicated - Urine, Clean Catch    Collection Time: 03/16/25  4:25 PM    Specimen: Urine, Clean Catch   Result Value Ref Range    Color, UA Yellow Yellow, Straw    Appearance, UA Clear Clear    pH, UA 5.5 5.0 - 8.0    Specific Gravity, UA  1.010 1.005 - 1.030    Glucose, UA Negative Negative    Ketones, UA Negative Negative    Bilirubin, UA Negative Negative    Blood, UA Trace (A) Negative    Protein, UA Negative Negative    Leuk Esterase, UA Negative Negative    Nitrite, UA Negative Negative    Urobilinogen, UA 0.2 E.U./dL 0.2 - 1.0 E.U./dL   Urinalysis, Microscopic Only - Urine, Clean Catch    Collection Time: 03/16/25  4:25 PM    Specimen: Urine, Clean Catch   Result Value Ref Range    RBC, UA 0-2 None Seen, 0-2 /HPF    WBC, UA 0-2 None Seen, 0-2 /HPF    Bacteria, UA None Seen None Seen /HPF    Squamous Epithelial Cells, UA 0-2 None Seen, 0-2 /HPF    Hyaline Casts, UA None Seen None Seen /LPF    Methodology Automated Microscopy          RADIOLOGY  CT Thoracic Spine Without Contrast  CT Thoracic Spine Without Contrast, CT Lumbar Spine Without Contrast  Result Date: 3/16/2025  EMERGENCY CT SCAN OF THE THORACIC AND LUMBAR SPINE ON 03/16/2025  CLINICAL HISTORY: This is a 74-year-old female patient who has scoliosis and presents to the emergency room complaining of severe back pain in mid to low back extending to right side.  CT SCAN OF THE THORACIC SPINE TECHNIQUE: Spiral CT images were obtained from the C6-7 cervical level down to the L1-2 lumbar level and the images were reformatted and are submitted in 3 mm thick axial CT sections with soft tissue algorithm and 1 mm thick axial CT sections with high-resolution bone algorithm and 2 mm thick sagittal and coronal reconstructions were performed and submitted in soft tissue algorithm.  FINDINGS: There is a subacute fracture of the right 11th posterior rib with some callus formation along its margins but it is a nonunited fracture. There is an additional subacute mildly displaced fracture of the far posterior medial right 12th rib with some callus formation along the anterior side of the fracture but no solid union. This patient has a scoliotic curvature of the thoracolumbar spine with a mild  levoscoliotic curvature of the lower cervical and upper thoracic spine and there is a dextroscoliosis of the lower thoracic and upper lumbar spine with its apex at the L1-2 level. There is prominent narrowing of the left side of the disc space, left lateral marginal endplate spurring at L1-2 and prominent disc space narrowing and there are some degenerative endplate changes at T12-L1. I see no significant canal or foraminal narrowing from C7 to L1. No acute thoracic spine fracture is identified.      1. This patient has a scoliotic curvature of the spine with a levoscoliotic curvature of the lower cervical and upper thoracic spine and a prominent dextroscoliotic curvature of the lower thoracic and upper lumbar spine with its apex at the L1-2 lumbar level where there is severe narrowing of the left side of the disc space and left lateral marginal endplate spurring indenting the medial psoas muscle. I see no acute fracture in the thoracic spine and no obvious canal or foraminal narrowing from C7 to L1.  2. There are subacute fractures with a subacute nondisplaced right posterior 12th rib fracture, a subacute mildly displaced fracture of the right 12th posterior medial rib near the costotransverse junction and there is some minimal callus formation along the anterior margins of these subacute rib fractures.  LUMBAR SPINE CT TECHNIQUE: Spiral CT images were obtained from the T11 thoracic level down to the S2 sacral level. The images were reformatted and submitted in 3 mm thick axial CT sections with soft tissue algorithm and 1 mm thick axial CT sections with high-resolution bone algorithm and 2 mm thick sagittal and coronal reconstructions were performed and submitted in soft tissue algorithm.  This is correlated to an abdomen and pelvis CT on 01/04/2024 and today on 03/16/2025. There are no prior lumbar spine CTs or MRIs for comparison.  FINDINGS: There is a marked rotary dextroscoliotic curvature of the lumbar spine  with its apex at the L2 lumbar level and there is severe narrowing of the left side of the disc space, large left lateral marginal endplate spurs at L1-2, L2-3 and L3-4 along the inner margin of the dextroscoliotic curve and there is 6 mm of left lateral translation of L1 with respect to L2 and there is severe narrowing of the right side of the disc space and right-sided degenerative endplate changes at L4-5 and L5-S1 with some right lateral translation of L4 with respect to L5. No convincing acute fracture is seen in the lumbar spine. There are subacute fractures of the right 11th posterior rib and a subacute displaced fracture of the right 12th posterior medial rib near the costotransverse junction with some minimal adjacent callus formation. There is an old healed fracture of the distal tip of the right L1 transverse process and an old fracture deformity of the right L2 transverse process and right L3 transverse process.  At T11-12, the posterior disc margin and facets are normal with no canal or foraminal narrowing.  At T12-L1, there is some disc space narrowing and anterior vacuum disc phenomenon but the posterior disc margin and facets are normal with no canal or foraminal narrowing.  At L1-2, there is severe narrowing of the left side of the disc space, 6 mm of left lateral translation of L1 with respect to L2. Far left lateral spurs indent the medial left psoas muscle. There is only minimal left posterior lateral endplate spurring. I see no canal or right foraminal narrowing. There is slight eccentric spurring into the left neural foramen with mild left foraminal narrowing.  At L2-3, there is severe narrowing of the left lateral disc space, left lateral marginal endplate spurring indenting the medial left psoas muscle and there is mild left facet overgrowth. I see no canal or foraminal narrowing.  At L3-4, there is severe narrowing of the left lateral disc space. Far left lateral marginal endplate spurs  indent the medial left psoas muscle. There is mild left but no right facet overgrowth. I see no canal or right foraminal narrowing. There is mild left foraminal narrowing.  At L4-5, there is some right lateral translation of L4 with respect to L5, vacuum disc phenomenon in the central to right lateral disc space. Large right lateral marginal endplate spurs indent the medial right psoas muscle. There is 3 mm retrolisthesis of L4 with respect to L5, some unroofed disc material bulging along the posterior superior endplate of L5 and there is at least mild up to mild-to-moderate narrowing of the thecal sac. There is right lateral recess narrowing that could affect the traversing right L5 nerve root. There is no left foraminal narrowing. There is severe right foraminal narrowing that could compromise the exiting right L4 nerve root.  At L5-S1, there is mild bilateral facet overgrowth. There is narrowing of the right lateral disc space, minimal right paracentral spurring. There is no canal or lateral recess or left foraminal narrowing. There is spurring off the endplates and facets into the right neural foramen and there is moderate-to-severe right foraminal narrowing that could affect the exiting right L5 nerve root.  IMPRESSION: 1. This patient has a marked rotary dextroscoliotic curvature of the lumbar spine with its apex at the L2 lumbar level and there is severe narrowing of the left side of the disc space and left lateral marginal endplate spurring at L1-2 and L2-3 and L3-4 along the inner margin of the dextroscoliotic curve and the spurs indent the medial psoas muscle at these levels and there is 6 mm of left lateral translation of L1 with respect to L2 and there is severe narrowing of the right lateral disc space and right lateral and foraminal marginal endplate spurring at L4-5 and L5-S1.  2. I see no acute fracture in the lumbar spine.  3. There are subacute fractures of the right 11th posterior rib and a  subacute displaced fracture of the right 12th posterior medial rib near the costotransverse junction with some minimal callus formation adjacent to these subacute rib fractures. There is an old healed fracture of the distal tip of the right L1 transverse process and old healed fracture deformities of the right L2 and L3 transverse processes.  4. Overall, I see no canal or foraminal narrowing at T11-12 and T12-L1 and there is no canal or right foraminal narrowing and only mild left foraminal narrowing at L1-2 and I see no canal or foraminal narrowing at L2-3. There is no canal or right foraminal narrowing at L3-4 and only mild left foraminal narrowing at L3-4.  5. At L4-5, there is a 3-4 mm retrolisthesis of L4 with respect to L5, some unroofed disc material bulging along the posterior superior endplate of L5 and there is at least mild up to mild-to-moderate narrowing of the thecal sac and there is slight narrowing of the right lateral recess that could potentially affect the traversing right L5 nerve root. There is no left foraminal narrowing. There is very severe right bony foraminal narrowing at L4-5 that could affect the exiting right L4 nerve root.  6. At L5-S1, there is mild bilateral facet overgrowth. There is no canal or lateral recess or left foraminal narrowing. There is eccentric narrowing of the right lateral disc space. There is endplate spurring and bulging disc material extending into the inferior right foramen and facet spurring into the posterior right foramen and there is moderate-to-severe right foraminal narrowing at L5-S1 that could compromise the exiting right L5 nerve root.  Radiation dose reduction techniques were utilized, including automated exposure control and exposure modulation based on body size.       CT Abdomen Pelvis Without Contrast  Result Date: 3/16/2025  CT ABDOMEN PELVIS WO CONTRAST-  Radiation dose reduction techniques were utilized, including automated exposure control and  exposure modulation based on body size.  Clinical: History of leukemia, right flank pain COMPARISON: Dated 1/4/2024  FINDINGS: The liver, gallbladder, pancreas, spleen and adrenal glands have a satisfactory appearance allowing for the lack of intravenous contrast. The kidneys are normal in appearance allowing for the lack of intravenous contrast as well. Size and contour maintained, no calculus or obstructive uropathy. There is atherosclerotic calcification of a normal diameter aorta. The bladder is collapsed. The uterus is satisfactory in size for the patient's age, previous oophorectomy. No abdominal or pelvic lymphadenopathy. The stomach is collapsed, stable in appearance, no duodenal abnormality identified. Caliber of the large and small bowel is within normal limits. Formed fecal material throughout the colon. No induration of the mesentery to suggest an inflammatory process. No free intraperitoneal gas nor fluid seen.  CONCLUSION: No acute intra-abdominal abnormality, specifically no renal calculus or obstructive uropathy.      This report was finalized on 3/16/2025 4:07 PM by Dr. Jimbo Herrera M.D on Workstation: [id number]          MEDICATIONS GIVEN IN ER  Medications   HYDROcodone-acetaminophen (NORCO) 5-325 MG per tablet 1 tablet (1 tablet Oral Given 3/16/25 4910)         ORDERS PLACED DURING THIS VISIT:  Orders Placed This Encounter   Procedures    CT Thoracic Spine Without Contrast    CT Lumbar Spine Without Contrast    CT Abdomen Pelvis Without Contrast    Urinalysis With Culture If Indicated - Urine, Clean Catch    Urinalysis, Microscopic Only - Urine, Clean Catch    Monitor Blood Pressure         OUTPATIENT MEDICATION MANAGEMENT:  No current Epic-ordered facility-administered medications on file.     Current Outpatient Medications Ordered in Epic   Medication Sig Dispense Refill    acalabrutinib (CALQUENCE) 100 MG capsule capsule Take 1 capsule by mouth Daily.      acetaminophen (TYLENOL) 325 MG  tablet Take 2 tablets by mouth.      acyclovir (ZOVIRAX) 800 MG tablet Take 1 tablet by mouth Daily.      alendronate (FOSAMAX) 70 MG tablet Take 1 tablet by mouth Every 7 (Seven) Days.      amLODIPine (NORVASC) 5 MG tablet TAKE 1 TABLET DAILY 90 tablet 2    amoxicillin (AMOXIL) 500 MG capsule Take 1 capsule by mouth. Prior to any dental procedure      aspirin 81 MG EC tablet Take 1 tablet by mouth Daily.      Biotin 1000 MCG tablet Take 2,000 mcg by mouth Daily.      calcium carbonate (OS-TYRON) 600 MG tablet Take 1 tablet by mouth Daily.      clopidogrel (PLAVIX) 75 MG tablet Take 1 tablet by mouth Daily. LD 8/2      HYDROcodone-acetaminophen (NORCO) 5-325 MG per tablet Take 1 tablet by mouth Every 6 (Six) Hours As Needed for Severe Pain for up to 3 days. 12 tablet 0    lisinopril-hydrochlorothiazide (PRINZIDE,ZESTORETIC) 10-12.5 MG per tablet TAKE 1 TABLET DAILY 90 tablet 2    Multiple Vitamin (multivitamin) capsule Take 1 capsule by mouth Daily.      Psyllium 28.3 % powder Take  by mouth Daily.      vitamin B-12 (CYANOCOBALAMIN) 100 MCG tablet Take 0.5 tablets by mouth Daily.      vitamin C (ASCORBIC ACID) 500 MG tablet Take 1 tablet by mouth Daily.      vitamin D3 125 MCG (5000 UT) capsule capsule Take 1 capsule by mouth Daily.         PROCEDURES  Procedures        PROGRESS, DATA ANALYSIS, CONSULTS, AND MEDICAL DECISION MAKING  All labs have been independently interpreted by me.  All radiology studies have been reviewed by me. All EKG's have been independently viewed and interpreted by me.  Discussion below represents my analysis of pertinent findings related to patient's condition, differential diagnosis, treatment plan and final disposition.    Differential diagnosis includes but is not limited to pyelonephritis, scoliosis, lumbar strain, thoracic spine fracture, lumbar spine fracture, degenerative disc disease, cauda equina syndrome.    Clinical Scores:                   ED Course as of 03/16/25 1737   Sun Mar  "16, 2025 1627 I independently interpreted the abdomen CT study and my findings are: No free air, no small bowel obstruction pattern   [OSCAR]   1736 I reviewed all the test results with the patient and her  at the bedside.  I explained the finding of acute/subacute rib fractures at the right 11th and 12th posterior margin.  I think that this is highly likely to be related to her acute back pain complaint today.  She has not had any recent falls or traumatic injuries, though.  There are, of course, severe scoliosis and degenerative changes in the spine notable.  These are still being worked up by her spine surgery specialist with plans for an outpatient MRI in the very near future.  Right now there is no sign of neurologic deficit at all.  I have no concern for a neurosurgical emergency condition.  I am confident that the patient is okay for discharge home now in stable condition.  We will review with her the usual \"return to ER \"instructions prior to discharge. [OSCAR]      ED Course User Index  [OSCAR] Basil Christianson MD         AS OF 17:37 EDT VITALS:    BP - 168/97  HR - 76  TEMP - 97.8 °F (36.6 °C)  O2 SATS - 98%    COMPLEXITY OF CARE  Admission was considered but after careful review of the patient's presentation, physical examination, diagnostic results, and response to treatment the patient may be safely discharged with outpatient follow-up.      DIAGNOSIS  Final diagnoses:   Closed fracture of multiple ribs of right side, initial encounter   Scoliosis of thoracolumbar spine, unspecified scoliosis type   Low back pain without sciatica, unspecified back pain laterality, unspecified chronicity         DISPOSITION  ED Disposition       ED Disposition   Discharge    Condition   Stable    Comment   --                Please note that portions of this document were completed with a voice recognition program.    Note Disclaimer: At [facility], we believe that sharing information builds trust and better " relationships. You are receiving this note because you recently visited [facility]. It is possible you will see health information before a provider has talked with you about it. This kind of information can be easy to misunderstand. To help you fully understand what it means for your health, we urge you to discuss this note with your provider.         Basil Christianson MD  03/16/25 8855

## 2025-03-16 NOTE — DISCHARGE INSTRUCTIONS
Take pain medication as directed.  Must follow-up for your outpatient MRI as scheduled and continue care with your spine specialist as planned.  Please return to the emergency room for any worsening pain, fevers, weakness, numbness or any other concerns.

## 2025-03-16 NOTE — ED NOTES
Patient c/o pain in her right shoulder blade down the right side of her back. Patient states she has scoliosis and has a specialist. Patient is requesting an MRI as her most recent xrays did not show anything. Patient states that the pain was so severe earlier, she started to cry.

## 2025-05-21 ENCOUNTER — APPOINTMENT (OUTPATIENT)
Dept: CT IMAGING | Facility: HOSPITAL | Age: 75
End: 2025-05-21
Payer: MEDICARE

## 2025-05-21 ENCOUNTER — HOSPITAL ENCOUNTER (EMERGENCY)
Facility: HOSPITAL | Age: 75
Discharge: HOME | End: 2025-05-21
Attending: EMERGENCY MEDICINE | Admitting: EMERGENCY MEDICINE
Payer: MEDICARE

## 2025-05-21 VITALS
DIASTOLIC BLOOD PRESSURE: 82 MMHG | OXYGEN SATURATION: 95 % | SYSTOLIC BLOOD PRESSURE: 158 MMHG | RESPIRATION RATE: 18 BRPM | WEIGHT: 104 LBS | BODY MASS INDEX: 17.75 KG/M2 | TEMPERATURE: 97.9 F | HEIGHT: 64 IN | HEART RATE: 98 BPM

## 2025-05-21 DIAGNOSIS — M41.9 SCOLIOSIS OF LUMBAR SPINE, UNSPECIFIED SCOLIOSIS TYPE: ICD-10-CM

## 2025-05-21 DIAGNOSIS — M54.16 ACUTE LUMBAR RADICULOPATHY: Primary | ICD-10-CM

## 2025-05-21 DIAGNOSIS — R93.89 ABNORMAL CT SCAN: ICD-10-CM

## 2025-05-21 LAB
BILIRUB UR QL STRIP: NEGATIVE
CLARITY UR: CLEAR
COLOR UR: YELLOW
GLUCOSE UR STRIP-MCNC: NEGATIVE MG/DL
HGB UR QL STRIP.AUTO: NEGATIVE
KETONES UR QL STRIP: NEGATIVE
LEUKOCYTE ESTERASE UR QL STRIP.AUTO: NEGATIVE
NITRITE UR QL STRIP: NEGATIVE
PH UR STRIP.AUTO: 6.5 [PH] (ref 5–8)
PROT UR QL STRIP: NEGATIVE
SP GR UR STRIP: 1.01 (ref 1–1.03)
UROBILINOGEN UR QL STRIP: NORMAL

## 2025-05-21 PROCEDURE — 72131 CT LUMBAR SPINE W/O DYE: CPT

## 2025-05-21 PROCEDURE — 81003 URINALYSIS AUTO W/O SCOPE: CPT

## 2025-05-21 PROCEDURE — 25010000002 DEXAMETHASONE SODIUM PHOSPHATE 10 MG/ML SOLUTION: Performed by: EMERGENCY MEDICINE

## 2025-05-21 PROCEDURE — 99284 EMERGENCY DEPT VISIT MOD MDM: CPT

## 2025-05-21 PROCEDURE — 74176 CT ABD & PELVIS W/O CONTRAST: CPT

## 2025-05-21 PROCEDURE — 96372 THER/PROPH/DIAG INJ SC/IM: CPT

## 2025-05-21 RX ORDER — METHYLPREDNISOLONE 4 MG/1
TABLET ORAL
Qty: 21 TABLET | Refills: 0 | Status: SHIPPED | OUTPATIENT
Start: 2025-05-21

## 2025-05-21 RX ORDER — METHOCARBAMOL 500 MG/1
500 TABLET, FILM COATED ORAL ONCE
Status: DISCONTINUED | OUTPATIENT
Start: 2025-05-21 | End: 2025-05-22 | Stop reason: HOSPADM

## 2025-05-21 RX ORDER — ONDANSETRON 2 MG/ML
4 INJECTION INTRAMUSCULAR; INTRAVENOUS ONCE
Status: DISCONTINUED | OUTPATIENT
Start: 2025-05-21 | End: 2025-05-22 | Stop reason: HOSPADM

## 2025-05-21 RX ORDER — LIDOCAINE 50 MG/G
1 PATCH TOPICAL EVERY 24 HOURS
Qty: 6 PATCH | Refills: 0 | Status: SHIPPED | OUTPATIENT
Start: 2025-05-21

## 2025-05-21 RX ORDER — HYDROCODONE BITARTRATE AND ACETAMINOPHEN 5; 325 MG/1; MG/1
1 TABLET ORAL ONCE
Refills: 0 | Status: COMPLETED | OUTPATIENT
Start: 2025-05-21 | End: 2025-05-21

## 2025-05-21 RX ORDER — METHOCARBAMOL 750 MG/1
750 TABLET, FILM COATED ORAL 3 TIMES DAILY PRN
Qty: 15 TABLET | Refills: 0 | Status: SHIPPED | OUTPATIENT
Start: 2025-05-21

## 2025-05-21 RX ORDER — LIDOCAINE 4 G/G
1 PATCH TOPICAL ONCE
Status: DISCONTINUED | OUTPATIENT
Start: 2025-05-21 | End: 2025-05-22 | Stop reason: HOSPADM

## 2025-05-21 RX ORDER — DEXAMETHASONE SODIUM PHOSPHATE 10 MG/ML
10 INJECTION, SOLUTION INTRAMUSCULAR; INTRAVENOUS ONCE
Status: COMPLETED | OUTPATIENT
Start: 2025-05-21 | End: 2025-05-21

## 2025-05-21 RX ORDER — MORPHINE SULFATE 2 MG/ML
4 INJECTION, SOLUTION INTRAMUSCULAR; INTRAVENOUS ONCE
Status: DISCONTINUED | OUTPATIENT
Start: 2025-05-21 | End: 2025-05-22 | Stop reason: HOSPADM

## 2025-05-21 RX ORDER — SODIUM CHLORIDE 0.9 % (FLUSH) 0.9 %
10 SYRINGE (ML) INJECTION AS NEEDED
Status: DISCONTINUED | OUTPATIENT
Start: 2025-05-21 | End: 2025-05-22 | Stop reason: HOSPADM

## 2025-05-21 RX ADMIN — DEXAMETHASONE SODIUM PHOSPHATE 10 MG: 10 INJECTION, SOLUTION INTRAMUSCULAR; INTRAVENOUS at 21:22

## 2025-05-21 RX ADMIN — LIDOCAINE 1 PATCH: 4 PATCH TOPICAL at 21:25

## 2025-05-21 RX ADMIN — HYDROCODONE BITARTRATE AND ACETAMINOPHEN 1 TABLET: 5; 325 TABLET ORAL at 21:19

## 2025-05-21 NOTE — ED PROVIDER NOTES
EMERGENCY DEPARTMENT ENCOUNTER      Room Number:  S03/03  PCP: Wilmar Marin MD  Independent Historians: Patient  Patient Care Team:  Wilmar Marin MD as PCP - General (Internal Medicine)  Poli Minaya MD as Consulting Physician (Hematology and Oncology)       HPI:  Chief Complaint: Back pain    A complete HPI/ROS/PMH/PSH/SH/FH are unobtainable due to: None    Chronic or social conditions impacting patient care (Social Determinants of Health): None      Context: Abigail Rizzo is a 74 y.o. female with a PMH significant for CLL, hypertension, scoliosis who presents to the ED c/o acute right-sided low back pain that seems to be progressing over the past couple of weeks.  She does admit the symptoms radiate into the right hip and down the right leg at times as well.  No numbness or weakness to the extremities, bowel or bladder incontinence, saddle paresthesias.  She does have a history of similar symptoms and has been managed with epidural injections in the past.  She has been using Tylenol without much relief.  No fever, chills.  No falls or injuries.      Upon review of prior external notes (non-ED) -and- Review of prior external test results outside of this encounter it appears the patient was evaluated in the office on May 19, 2025 by general surgery for fracture of the right rib.  The patient had a normal phosphorus on 4/9/25 and a normal CBC on 2/27/2025.      PAST MEDICAL HISTORY  Active Ambulatory Problems     Diagnosis Date Noted    Chronic lymphoid leukemia 02/26/2016    Cerebral aneurysm without rupture 06/12/2018    Hypertension     Positive colorectal cancer screening using Cologuard test 03/05/2021    Personal history of colonic polyps 04/18/2024     Resolved Ambulatory Problems     Diagnosis Date Noted    No Resolved Ambulatory Problems     Past Medical History:   Diagnosis Date    Aneurysm     Cerebral aneurysm     CLL (chronic lymphocytic leukemia)     CLL (chronic lymphocytic leukemia)     H/O  Broken shoulder     H/O Foot deformities     H/O Ovarian cysts     Injury of back     Lumbosacral disc disease     PONV (postoperative nausea and vomiting)     Scoliosis          PAST SURGICAL HISTORY  Past Surgical History:   Procedure Laterality Date    CEREBRAL ANEURYSM REPAIR      MCA ANEURYSM EMBOLIZATION WITH STENT ASSISTED COILING    COLONOSCOPY N/A 04/12/2021    Procedure: COLONOSCOPY TO CECUM WITH COLD SNARE POLYPECTOMIES WITH CLIP TO RECTOSIGMOID SITE;  Surgeon: Malick Rosales MD;  Location:  [facility];  Service: Gastroenterology;  Laterality: N/A;  PRE:Positive cologuard  POST:POLYPS    COLONOSCOPY W/ POLYPECTOMY N/A 8/8/2024    Procedure: COLONOSCOPY to cecum WITH cold BIOPSY POLYPECTOMY;  Surgeon: Malick Rosales MD;  Location:  [facility];  Service: Gastroenterology;  Laterality: N/A;  pre: h/o colon polyps  post: polyp    EPIDURAL BLOCK      FOOT SURGERY      1979 to 2000 various foot deformities corrected    NASAL SEPTUM SURGERY      OOPHORECTOMY Left 1992    and part of right    SHOULDER SURGERY Left 1989    Replacement of the left shoulder in 1989 in [place], [place] because of osteoarthitis, trauma, and pain    SHOULDER SURGERY Left 2005    Shoulder revision         FAMILY HISTORY  Family History   Problem Relation Age of Onset    Leukemia Mother 85        CLL    Allergy (severe) Mother     Hypertension Mother     Skin cancer Mother 16    Thrombosis Father         Cerebral thrombosis    Bleeding Disorder Sister     Cervical cancer Sister 49    Malig Hyperthermia Neg Hx          SOCIAL HISTORY  Social History     Socioeconomic History    Marital status:      Spouse name: Denilson    Number of children: 0    Years of education: College   Tobacco Use    Smoking status: Never    Smokeless tobacco: Never   Vaping Use    Vaping status: Never Used   Substance and Sexual Activity    Alcohol use: Yes     Alcohol/week: 1.0 standard drink of alcohol     Types: 1 Glasses of  wine per week     Comment: Occasional    Drug use: No    Sexual activity: Defer         ALLERGIES  Amoxapine, Brindall berry-chromium al, Cinnamon, Strawberry extract, and Diphenhydramine hcl      REVIEW OF SYSTEMS  Included in HPI  All systems reviewed and negative except for those discussed in HPI.      PHYSICAL EXAM    I have reviewed the triage vital signs and nursing notes.    ED Triage Vitals   Temp Heart Rate Resp BP SpO2   05/21/25 1656 05/21/25 1656 05/21/25 1656 05/21/25 1704 05/21/25 1656   97.9 °F (36.6 °C) 81 18 126/77 95 %      Temp src Heart Rate Source Patient Position BP Location FiO2 (%)   05/21/25 1656 -- 05/21/25 1704 05/21/25 1704 --   Oral  Sitting Right arm        Physical Exam  Constitutional:       General: She is not in acute distress.     Appearance: She is well-developed.   HENT:      Head: Normocephalic and atraumatic.   Eyes:      General: No scleral icterus.     Conjunctiva/sclera: Conjunctivae normal.   Neck:      Trachea: No tracheal deviation.   Cardiovascular:      Rate and Rhythm: Normal rate and regular rhythm.   Pulmonary:      Effort: Pulmonary effort is normal.      Breath sounds: Normal breath sounds.   Abdominal:      Palpations: Abdomen is soft.      Tenderness: There is no abdominal tenderness.   Musculoskeletal:         General: No deformity.      Cervical back: Normal range of motion.      Comments: Scoliotic curvature to the spine diffusely.  No focal tenderness.  Diffusely tender to palpation to the right sided paraspinous lumbar region.   Lymphadenopathy:      Cervical: No cervical adenopathy.   Skin:     General: Skin is warm and dry.   Neurological:      Mental Status: She is alert and oriented to person, place, and time.      Sensory: Sensation is intact.      Motor: Motor function is intact.   Psychiatric:         Behavior: Behavior normal.         Vital signs and nursing notes reviewed.      PPE: I wore a surgical mask throughout my encounters with the pt. I  performed hand hygiene on entry into the pt room and upon exit.     LAB RESULTS  Recent Results (from the past 24 hours)   Urinalysis With Culture If Indicated - Urine, Clean Catch    Collection Time: 05/21/25  7:18 PM    Specimen: Urine, Clean Catch   Result Value Ref Range    Color, UA Yellow Yellow, Straw    Appearance, UA Clear Clear    pH, UA 6.5 5.0 - 8.0    Specific Gravity, UA 1.012 1.005 - 1.030    Glucose, UA Negative Negative    Ketones, UA Negative Negative    Bilirubin, UA Negative Negative    Blood, UA Negative Negative    Protein, UA Negative Negative    Leuk Esterase, UA Negative Negative    Nitrite, UA Negative Negative    Urobilinogen, UA 0.2 E.U./dL 0.2 - 1.0 E.U./dL         RADIOLOGY  CT Abdomen Pelvis Without Contrast  Result Date: 5/21/2025  CT ABDOMEN AND PELVIS WITHOUT IV CONTRAST  HISTORY: Right flank pain  TECHNIQUE: Radiation dose reduction techniques were utilized, including automated exposure control and exposure modulation based on body size. 3 mm images were obtained through the abdomen and pelvis without IV contrast.  COMPARISON: None      FINDINGS AND IMPRESSION: Evaluation is suboptimal due to beam hardening and streak artifact from patient's left arm being on there side.  Air-fluid levels are present within the small bowel without findings of discrete small bowel distention. THE APPENDIX IS NOT DEFINITIVELY SEEN. GIVEN PATIENT HISTORY OF RIGHT-SIDED PAIN, PLEASE NOTE APPENDICITIS CANNOT BE EXCLUDED WITH THIS EXAMINATION AND IF THERE IS CLINICAL CONCERN FOR APPENDICITIS, RECOMMEND CONTINUED ATTENTION ON FOLLOW-UP WITH AT LEAST SERIAL ABDOMINAL EXAMS WITH GENERAL SURGERY CONSULTATION IF CLINICALLY INDICATED.  The liver, gallbladder, pancreas, spleen and kidneys have an unremarkable noncontrast CT appearance. No hydronephrosis; however, please note the ureters are difficult to follow in their entirety due to lack of intraperitoneal fat. Bladder decompressed and cannot be evaluated.  1.4 cm right adrenal nodule is indeterminate density but grossly unchanged in size since 7/18/2023.  No definite free intraperitoneal air is seen. There is colonic diverticulosis.  No abdominal pelvic adenopathy by size criteria. No suspicious lytic or blastic osseous lesions. Significant dextroscoliosis of the lumbar spine is present, best evaluated on subsequent CT lumbar spine. Please refer to this dictation for further information.  This report was finalized on 5/21/2025 10:00 PM by Dr. Boaz Marino M.D on Workstation: [id number]      CT Lumbar Spine Without Contrast  Result Date: 5/21/2025  CT LUMBAR SPINE without contrast..  HISTORY: Low back and right flank pain, pain radiates to right leg.  TECHNIQUE: Routine lumbar spine CT without contrast..   Radiation dose reduction techniques were utilized, including automated exposure control, and exposure modulation based on body size.  COMPARISON: Lumbar spine CT 3/16/2025.  FINDINGS:  Redemonstrated marked lumbar dextroscoliosis, no change in alignment. No lytic or blastic bone lesion.  No evidence of acute fracture.  The paraspinous soft tissues are unremarkable.  Mild degenerative canal stenosis at L3-4 and moderate canal stenosis at L4-5 appear unchanged.       Chronic marked dextroscoliosis and degenerative changes, no acute abnormality nor convincing interval change since 3/16/2025. No convincing evidence of acute abnormality.     This report was finalized on 5/21/2025 8:33 PM by Dr. Donte Lopez M.D on Workstation: [id number]          MEDICATIONS GIVEN IN ER  Medications   sodium chloride 0.9 % flush 10 mL (has no administration in time range)   morphine injection 4 mg (4 mg Intravenous Not Given 5/21/25 2038)   ondansetron (ZOFRAN) injection 4 mg (4 mg Intravenous Not Given 5/21/25 2038)   methocarbamol (ROBAXIN) tablet 500 mg (500 mg Oral Not Given 5/21/25 2120)   Lidocaine 4 % 1 patch (1 patch Transdermal Medication Applied 5/21/25 2125)    HYDROcodone-acetaminophen (NORCO) 5-325 MG per tablet 1 tablet (1 tablet Oral Given 5/21/25 2119)   dexAMETHasone sodium phosphate injection 10 mg (10 mg Intramuscular Given 5/21/25 2122)         ORDERS PLACED DURING THIS VISIT:  Orders Placed This Encounter   Procedures    CT Lumbar Spine Without Contrast    CT Abdomen Pelvis Without Contrast    Urinalysis With Culture If Indicated - Urine, Clean Catch    Insert Peripheral IV         OUTPATIENT MEDICATION MANAGEMENT:  Current Facility-Administered Medications Ordered in Epic   Medication Dose Route Frequency Provider Last Rate Last Admin    Lidocaine 4 % 1 patch  1 patch Transdermal Once Donavon Addison MD   1 patch at 05/21/25 2125    methocarbamol (ROBAXIN) tablet 500 mg  500 mg Oral Once Donavon Addison MD        morphine injection 4 mg  4 mg Intravenous Once Braden Gonzalez MD        ondansetron (ZOFRAN) injection 4 mg  4 mg Intravenous Once Braden Gonzalez MD        sodium chloride 0.9 % flush 10 mL  10 mL Intravenous PRN Braden Gonzalez MD         Current Outpatient Medications Ordered in Epic   Medication Sig Dispense Refill    acalabrutinib (CALQUENCE) 100 MG capsule capsule Take 1 capsule by mouth Daily.      acetaminophen (TYLENOL) 325 MG tablet Take 2 tablets by mouth.      acyclovir (ZOVIRAX) 800 MG tablet Take 1 tablet by mouth Daily.      alendronate (FOSAMAX) 70 MG tablet Take 1 tablet by mouth Every 7 (Seven) Days.      amLODIPine (NORVASC) 5 MG tablet TAKE 1 TABLET DAILY 90 tablet 2    amoxicillin (AMOXIL) 500 MG capsule Take 1 capsule by mouth. Prior to any dental procedure      aspirin 81 MG EC tablet Take 1 tablet by mouth Daily.      Biotin 1000 MCG tablet Take 2,000 mcg by mouth Daily.      calcium carbonate (OS-TYRON) 600 MG tablet Take 1 tablet by mouth Daily.      clopidogrel (PLAVIX) 75 MG tablet Take 1 tablet by mouth Daily. LD 8/2      lidocaine (LIDODERM) 5 % Place 1 patch on the skin as directed by provider Daily. Remove & Discard  patch within 12 hours or as directed by MD [place] patch 0    lisinopril-hydrochlorothiazide (PRINZIDE,ZESTORETIC) 10-12.5 MG per tablet TAKE 1 TABLET DAILY 90 tablet 2    methocarbamol (ROBAXIN) 750 MG tablet Take 1 tablet by mouth 3 (Three) Times a Day As Needed for Muscle Spasms. 15 tablet 0    methylPREDNISolone (MEDROL) 4 MG dose pack Take as directed on package instructions. 21 tablet 0    Multiple Vitamin (multivitamin) capsule Take 1 capsule by mouth Daily.      Psyllium 28.3 % powder Take  by mouth Daily.      vitamin B-12 (CYANOCOBALAMIN) 100 MCG tablet Take 0.5 tablets by mouth Daily.      vitamin C (ASCORBIC ACID) 500 MG tablet Take 1 tablet by mouth Daily.      vitamin D3 125 MCG (5000 UT) capsule capsule Take 1 capsule by mouth Daily.               PROGRESS, DATA ANALYSIS, CONSULTS, AND MEDICAL DECISION MAKING  All labs have been independently interpreted by me.  All radiology studies have been reviewed by me. All EKG's have been independently viewed and interpreted by me.  Discussion below represents my analysis of pertinent findings related to patient's condition, differential diagnosis, treatment plan and final disposition.        DIFFERENTIAL DIAGNOSIS INCLUDE BUT NOT LIMITED TO:     Differential diagnosis includes but is not limited to:    - Lumbar strain  - Lumbar radiculopathy  - Lumbar disc bulge/herniation  - Lumbar spinal stenosis  - Vertebral fracture  - Cauda equina syndrome  - Vertebral osteomyelitis  - Kidney stone  - Shingles      Clinical Scores: N/A      ED Course as of 05/21/25 2209   Wed May 21, 2025   1935 First look: Patient presents with a several week history of right low back pain with radiation down to the right lower extremity.  No overt weakness.  Ambulates well.  Plan for pain control, CT imaging. [EE]   2033 CT Abdomen Pelvis Without Contrast  My independent interpretation of the imaging study is no bowel obstruction, there is significant constipation [AB]   2034 CT Lumbar Spine  Without Contrast  My independent interpretation of the imaging study is no acute fracture [AB]   2145 Patient presentation and evaluation consistent with acute right-sided lumbar radiculopathy.  With underlying history of scoliosis she certainly has extensive degenerative changes to the low spine on CT imaging today.  No acute findings to indicate the need for admission or emergent consultation with neurosurgery at this time.  Appropriate at this time for outpatient management with supportive care, oral steroids, neurosurgical follow-up as an outpatient for pain management.  Patient agreeable with this plan and all questions answered.  She ambulates unassisted with a steady gait. [DC]      ED Course User Index  [AB] Donavon Addison MD  [DC] Denilson Garvey PA  [EE] Odell Mcdonald, PA         2149 I rechecked the patient.  I discussed the patient's labs, radiology findings (including all incidental findings), diagnosis, and plan for discharge.  A repeat exam reveals no new worrisome changes from my initial exam findings.  The patient understands that the fact that they are being discharged does not denote that nothing is abnormal, it indicates that no clinical emergency is present and that they must follow-up as directed in order to properly maintain their health.  Follow-up instructions (specifically listed below) and return to ER precautions were given at this time.  I specifically instructed the patient to follow-up with their PCP.  The patient understands and agrees with the plan, and is ready for discharge.  All questions answered.         AS OF 22:09 EDT VITALS:    BP - 158/82  HR - 98  TEMP - 97.9 °F (36.6 °C) (Oral)  O2 SATS - 95%    COMPLEXITY OF CARE  Admission was considered but after careful review of the patient's presentation, physical examination, diagnostic results, and response to treatment the patient may be safely discharged with outpatient follow-up.      DIAGNOSIS  Final diagnoses:   Acute  lumbar radiculopathy   Abnormal CT scan   Scoliosis of lumbar spine, unspecified scoliosis type         DISPOSITION  ED Disposition       ED Disposition   Discharge    Condition   Stable    Comment   --                Please note that portions of this document were completed with a voice recognition program.    Note Disclaimer: At [facility], we believe that sharing information builds trust and better relationships. You are receiving this note because you recently visited [facility]. It is possible you will see health information before a provider has talked with you about it. This kind of information can be easy to misunderstand. To help you fully understand what it means for your health, we urge you to discuss this note with your provider.         Denilson Garvey PA  05/21/25 1730

## 2025-05-21 NOTE — ED TRIAGE NOTES
Pt presents to ED via private vehicle with right lower back pain that radiates down right leg Xs 2.5 weeks. Denies falls or injuries. Hx of scoliosis. Pt also had nerve block on her ribs 2 weeks ago. Also having some tingling to right foot.

## 2025-05-22 NOTE — ED PROVIDER NOTES
MD ATTESTATION NOTE  I supervised care provided by the APC. We have discussed this patient's history, physical exam, and treatment plan. I have reviewed the APC's note and I agree with the APC's findings and plan of care.   SHARED VISIT: This visit was performed by BOTH a physician and an APC. The substantive portion of the medical decision making was performed by this attesting physician who made or approved the management plan and takes responsibility for patient management. All studies in the APC note (if performed) were independently interpreted by me.   I have personally had a face to face encounter with the patient.     PCP: Wilmar Marin MD  Patient Care Team:  Wilmar Marin MD as PCP - General (Internal Medicine)  Poli Minaya MD as Consulting Physician (Hematology and Oncology)     Abigail Rizzo is a 74 y.o. female who presents to the ED c/o back pain.  Patient has had at least 2 weeks of low back pain.  She denies any falls or injuries.  She will have radiating pain to the lower right lower extremity.  She denies saddle anesthesia, incontinence or retention.  Most recent bowel movement was this morning and was normal.  No changes in urination or dysuria.    On exam:  General: NAD.  Head: NCAT.  ENT: nares patent, no scleral icterus  Neck: Supple, trachea midline.  Cardiac: regular rate and rhythm.  Lungs: normal effort, clear to auscultation bilaterally  Abdomen: Soft, nondistended, NTTP, no rebound tenderness, no guarding or rigidity.   Extremities: Moves all extremities well, no peripheral edema  MS; spine with scoliosis, no point tenderness over the thoracic or lumbar spine  Neuro: alert, MAEW, follows commands, equal strength and sensation bilateral lower extremities  Psych: calm, cooperative  Skin: Warm, dry.    Medical Decision Making:  After the initial H&P, I discussed pertinent information from history and physical exam with patient/family.  Discussed differential diagnosis.  Discussed plan  for ED evaluation/work-up/treatment.  All questions answered.  Patient/family is agreeable with plan.    ED Course as of 05/21/25 2209   Wed May 21, 2025   1935 First look: Patient presents with a several week history of right low back pain with radiation down to the right lower extremity.  No overt weakness.  Ambulates well.  Plan for pain control, CT imaging. [EE]   2033 CT Abdomen Pelvis Without Contrast  My independent interpretation of the imaging study is no bowel obstruction, there is significant constipation [AB]   2034 CT Lumbar Spine Without Contrast  My independent interpretation of the imaging study is no acute fracture [AB]   2145 Patient presentation and evaluation consistent with acute right-sided lumbar radiculopathy.  With underlying history of scoliosis she certainly has extensive degenerative changes to the low spine on CT imaging today.  No acute findings to indicate the need for admission or emergent consultation with neurosurgery at this time.  Appropriate at this time for outpatient management with supportive care, oral steroids, neurosurgical follow-up as an outpatient for pain management.  Patient agreeable with this plan and all questions answered.  She ambulates unassisted with a steady gait. [DC]      ED Course User Index  [AB] Donavon Addison MD  [DC] Denilson Garvey PA  [EE] Odell Mcdonald PA       Diagnosis  Final diagnoses:   Acute lumbar radiculopathy   Abnormal CT scan   Scoliosis of lumbar spine, unspecified scoliosis type        Donavon Addison MD  05/21/25 2209

## 2025-07-02 NOTE — PROGRESS NOTES
Subjective   History of Present Illness: Abigail Rizzo is a 74 y.o. female is being seen for consultation today at the request of Donavon Addison MD for a right sided lumbar radiculopathy. CT lumbar 5/21/25, Thoracic MRI-3/21/25.  She reports that she had a ground-level fall in October of last year and had fractures of the 11th and 12th rib on the right.  She reports she also has a history of severe scoliosis.  She has seen several other spine surgeons in the past and told she is not a candidate for surgery.  She gets intermittent episodes of severe lower back pain and right lower extremity pain.  She reports that the pain has been managed in the past with steroid epidural injections by pain management.    History of Present Illness    The following portions of the patient's history were reviewed and updated as appropriate: allergies, current medications, past family history, past medical history, past social history, past surgical history, and problem list.    Past Medical History:   Diagnosis Date    Aneurysm     Cerebral aneurysm     CLL (chronic lymphocytic leukemia)     /SLL    CLL (chronic lymphocytic leukemia)     H/O Broken shoulder     S/P seizure while on Asendin.    H/O Foot deformities     Various foot deformities corrected from 1979    H/O Ovarian cysts     Hypertension     Injury of back     Lumbosacral disc disease     PONV (postoperative nausea and vomiting)     Scoliosis     Gives back pain        Past Surgical History:   Procedure Laterality Date    CEREBRAL ANEURYSM REPAIR      MCA ANEURYSM EMBOLIZATION WITH STENT ASSISTED COILING    COLONOSCOPY N/A 04/12/2021    Procedure: COLONOSCOPY TO CECUM WITH COLD SNARE POLYPECTOMIES WITH CLIP TO RECTOSIGMOID SITE;  Surgeon: Malick Rosales MD;  Location: [facility] ENDOSCOPY;  Service: Gastroenterology;  Laterality: N/A;  PRE:Positive cologuard  POST:POLYPS    COLONOSCOPY W/ POLYPECTOMY N/A 8/8/2024    Procedure: COLONOSCOPY to cecum WITH cold BIOPSY  POLYPECTOMY;  Surgeon: Malick Rosales MD;  Location: [facility] ENDOSCOPY;  Service: Gastroenterology;  Laterality: N/A;  pre: h/o colon polyps  post: polyp    EPIDURAL BLOCK      FOOT SURGERY      1979 to 2000 various foot deformities corrected    NASAL SEPTUM SURGERY      OOPHORECTOMY Left 1992    and part of right    SHOULDER SURGERY Left 1989    Replacement of the left shoulder in 1989 in [place], [place] because of osteoarthitis, trauma, and pain    SHOULDER SURGERY Left 2005    Shoulder revision          Current Outpatient Medications:     acalabrutinib (CALQUENCE) 100 MG capsule capsule, Take 1 capsule by mouth Daily., Disp: , Rfl:     acetaminophen (TYLENOL) 325 MG tablet, Take 2 tablets by mouth., Disp: , Rfl:     acyclovir (ZOVIRAX) 800 MG tablet, Take 1 tablet by mouth Daily., Disp: , Rfl:     alendronate (FOSAMAX) 70 MG tablet, Take 1 tablet by mouth Every 7 (Seven) Days., Disp: , Rfl:     amLODIPine (NORVASC) 5 MG tablet, TAKE 1 TABLET DAILY, Disp: 90 tablet, Rfl: 2    amoxicillin (AMOXIL) 500 MG capsule, Take 1 capsule by mouth. Prior to any dental procedure, Disp: , Rfl:     aspirin 81 MG EC tablet, Take 1 tablet by mouth Daily., Disp: , Rfl:     Biotin 1000 MCG tablet, Take 2,000 mcg by mouth Daily., Disp: , Rfl:     calcium carbonate (OS-TYRON) 600 MG tablet, Take 1 tablet by mouth Daily., Disp: , Rfl:     lidocaine (LIDODERM) 5 %, Place 1 patch on the skin as directed by provider Daily. Remove & Discard patch within 12 hours or as directed by MD, Disp: 6 patch, Rfl: 0    lisinopril-hydrochlorothiazide (PRINZIDE,ZESTORETIC) 10-12.5 MG per tablet, TAKE 1 TABLET DAILY, Disp: 90 tablet, Rfl: 2    Multiple Vitamin (multivitamin) capsule, Take 1 capsule by mouth Daily., Disp: , Rfl:     Psyllium 28.3 % powder, Take  by mouth Daily., Disp: , Rfl:     vitamin B-12 (CYANOCOBALAMIN) 100 MCG tablet, Take 0.5 tablets by mouth Daily., Disp: , Rfl:     vitamin C (ASCORBIC ACID) 500 MG tablet, Take 1 tablet  "by mouth Daily., Disp: , Rfl:     vitamin D3 125 MCG (5000 UT) capsule capsule, Take 1 capsule by mouth Daily., Disp: , Rfl:     clopidogrel (PLAVIX) 75 MG tablet, Take 1 tablet by mouth Daily. LD 8/2, Disp: , Rfl:     methocarbamol (ROBAXIN) 750 MG tablet, Take 1 tablet by mouth 3 (Three) Times a Day As Needed for Muscle Spasms., Disp: 15 tablet, Rfl: 0    methylPREDNISolone (MEDROL) 4 MG dose pack, Take as directed on package instructions., Disp: 21 tablet, Rfl: 0     Allergies   Allergen Reactions    Amoxapine Other (See Comments)     Passed out  01/23/2006-ASENDIN seizure  Passed out      Brindall Salmeron-Chromium Melissa Unknown - Low Severity     \"burns mouth\" (strawberry)     Cinnamon Unknown - Low Severity     \"Burns mouth\"  \"Burns mouth\"    Coconut (Cocos Nucifera) GI Intolerance    Methocarbamol GI Intolerance    Mobic [Meloxicam] GI Intolerance    Strawberry Extract Other (See Comments)     \"burns mouth\"    Diphenhydramine Hcl Other (See Comments)     Redness,  Pt prefers not to take        Social History     Socioeconomic History    Marital status:      Spouse name: Denilson    Number of children: 0    Years of education: College   Tobacco Use    Smoking status: Never    Smokeless tobacco: Never   Vaping Use    Vaping status: Never Used   Substance and Sexual Activity    Alcohol use: Yes     Alcohol/week: 1.0 standard drink of alcohol     Types: 1 Glasses of wine per week     Comment: Occasional    Drug use: No    Sexual activity: Defer        Family History   Problem Relation Age of Onset    Leukemia Mother 85        CLL    Allergy (severe) Mother     Hypertension Mother     Skin cancer Mother 16    Thrombosis Father         Cerebral thrombosis    Bleeding Disorder Sister     Cervical cancer Sister 49    Malig Hyperthermia Neg Hx         Review of Systems   Constitutional:  Negative for chills and fever.   Cardiovascular:  Positive for leg swelling (right foot).   Genitourinary:  Negative for difficulty " "urinating, frequency and urgency.   Musculoskeletal:  Positive for back pain (Right leg pain). Negative for gait problem.   Neurological:  Positive for numbness (tingling). Negative for weakness.   All other systems reviewed and are negative.      Objective     Vitals:    25 0852   BP: 128/70   Pulse: 68   Temp: 96.9 °F (36.1 °C)   SpO2: 98%   Weight: 47.9 kg (105 lb 11.2 oz)   Height: 162.6 cm (64\")     Body mass index is 18.14 kg/m².      Physical Exam  Awake, alert, oriented x3  Face symmetric  Motor exam  Bilateral deltoids 5/5, bilateral biceps 5/5, bilateral triceps 5/5, bilateral wrist extension 5/5 bilateral hand  5/5  Bilateral hip flexion 5/5, bilateral knee extension 5/5, bilateral DF/PF 5/5  No clonus  No Jesusita's reflex  Steady normal gait  Able to detect  light touch in all 4 extremities      Assessment & Plan   Independent Review of Radiographic Studies:      I personally reviewed the images from the following studies.  MRI of the thoracic spine from 2025  The MRI images were reviewed and show no evidence of fracture.  No evidence of severe canal or neuroforaminal stenosis.  CT of the lumbar spine demonstrates severe dextroscoliosis with no obvious fractures or lesions.  Mild canal stenosis is noted at L3-4 and L4-5    Medical Decision Makin-year-old female with chronic lower back pain and intermittent right lower extremity pain  -She is requesting a referral to pain management and physical therapy.  She reports that in the past she has had relief from her intermittent right lower extremity pain after steroid epidural injections.  She also reports that she had T11 and T12 closed rib fractures in October and is requesting a possible intercostal nerve block.  I will plan to refer her to pain management and physical therapy for further evaluation and treatment.  -She has been seen by several other spine surgeons in the past and told she is not a candidate for corrective " surgery for her scoliosis  Diagnoses and all orders for this visit:    1. Right leg pain (Primary)  -     Ambulatory Referral to Physical Therapy for Evaluation & Treatment  -     Ambulatory Referral to Pain Management Clinic    2. DDD (degenerative disc disease), thoracolumbar  -     Ambulatory Referral to Physical Therapy for Evaluation & Treatment  -     Ambulatory Referral to Pain Management Clinic    3. Degeneration of intervertebral disc of lumbosacral region with lower extremity pain  -     Ambulatory Referral to Physical Therapy for Evaluation & Treatment  -     Ambulatory Referral to Pain Management Clinic    4. Closed fracture of multiple ribs of right side, sequela  -     Ambulatory Referral to Physical Therapy for Evaluation & Treatment  -     Ambulatory Referral to Pain Management Clinic      Return if symptoms worsen or fail to improve.    I spent 40 minutes reviewing the medical record, reviewing the MRI images, reviewing the CT images, reviewing the x-ray images, discussing the management of osteopenia, discussing the management of back pain, discussing the management of sciatica

## 2025-07-07 ENCOUNTER — OFFICE VISIT (OUTPATIENT)
Dept: NEUROSURGERY | Facility: CLINIC | Age: 75
End: 2025-07-07
Payer: MEDICARE

## 2025-07-07 VITALS
HEART RATE: 68 BPM | OXYGEN SATURATION: 98 % | HEIGHT: 64 IN | WEIGHT: 105.7 LBS | SYSTOLIC BLOOD PRESSURE: 128 MMHG | TEMPERATURE: 96.9 F | BODY MASS INDEX: 18.04 KG/M2 | DIASTOLIC BLOOD PRESSURE: 70 MMHG

## 2025-07-07 DIAGNOSIS — M79.604 RIGHT LEG PAIN: Primary | ICD-10-CM

## 2025-07-07 DIAGNOSIS — M51.35 DDD (DEGENERATIVE DISC DISEASE), THORACOLUMBAR: ICD-10-CM

## 2025-07-07 DIAGNOSIS — S22.41XS CLOSED FRACTURE OF MULTIPLE RIBS OF RIGHT SIDE, SEQUELA: ICD-10-CM

## 2025-07-07 DIAGNOSIS — M51.371 DEGENERATION OF INTERVERTEBRAL DISC OF LUMBOSACRAL REGION WITH LOWER EXTREMITY PAIN: ICD-10-CM

## 2025-07-11 ENCOUNTER — PATIENT ROUNDING (BHMG ONLY) (OUTPATIENT)
Dept: NEUROSURGERY | Facility: CLINIC | Age: 75
End: 2025-07-11
Payer: MEDICARE

## 2025-07-14 ENCOUNTER — OFFICE VISIT (OUTPATIENT)
Age: 75
End: 2025-07-14
Payer: MEDICARE

## 2025-07-14 VITALS
OXYGEN SATURATION: 97 % | DIASTOLIC BLOOD PRESSURE: 76 MMHG | BODY MASS INDEX: 17.58 KG/M2 | HEART RATE: 68 BPM | SYSTOLIC BLOOD PRESSURE: 130 MMHG | WEIGHT: 103 LBS | HEIGHT: 64 IN

## 2025-07-14 DIAGNOSIS — I10 PRIMARY HYPERTENSION: Primary | ICD-10-CM

## 2025-07-14 NOTE — PROGRESS NOTES
[facility] Follow Up Office Note     Encounter Date:25  Patient:Abigail Rizzo  :1950  MRN:[id number]      Chief Complaint:   Chief Complaint   Patient presents with    Follow-up         History of Presenting Illness:      Abigail Rizzo is a 74 y.o. female  that follows with  and is new to me today. They have a medical history of essential hypertension. They are here today for follow up.      Echocardiogram from 2020 demonstrated normal LV function, mild LVH, and no significant valvular abnormalities.     Patient was last seen by Dr. Marie on 24. At that time she was doing well. She did have mild right lower extremity edema. No medication changes made.     Patient reports today for 1 year follow-up.  Overall she is doing very well.  She reports that she found out that the swelling in her feet at last visit was driven by osteoarthritis.  Additionally she had a recent carotid artery duplex scan after discovery of bruit in her neck.  Ultrasound demonstrated no significant stenosis.  She did present with a list of blood pressure readings which demonstrate a pretty well-controlled blood pressure.  Patient would like to stay on current medication regimen.  She denies chest pain, shortness of breath, palpitations, peripheral edema, dizziness, and fatigue.    Review of Systems:  Review of Systems   Constitutional: Negative. Negative for malaise/fatigue.   HENT: Negative.     Eyes: Negative.    Cardiovascular:  Negative for chest pain, dyspnea on exertion, irregular heartbeat, leg swelling, orthopnea, palpitations and syncope.   Respiratory:  Negative for cough, shortness of breath and snoring.    Hematologic/Lymphatic: Negative.    Skin: Negative.    Musculoskeletal: Negative.    Gastrointestinal: Negative.    Genitourinary: Negative.    Neurological:  Negative for dizziness, headaches and light-headedness.   Psychiatric/Behavioral: Negative.         Current Outpatient Medications  on File Prior to Visit   Medication Sig Dispense Refill    acalabrutinib (CALQUENCE) 100 MG capsule capsule Take 1 capsule by mouth Daily.      acetaminophen (TYLENOL) 325 MG tablet Take 2 tablets by mouth.      acyclovir (ZOVIRAX) 800 MG tablet Take 1 tablet by mouth Daily.      alendronate (FOSAMAX) 70 MG tablet Take 1 tablet by mouth Every 7 (Seven) Days.      amLODIPine (NORVASC) 5 MG tablet TAKE 1 TABLET DAILY 90 tablet 2    amoxicillin (AMOXIL) 500 MG capsule Take 1 capsule by mouth. Prior to any dental procedure      aspirin 81 MG EC tablet Take 1 tablet by mouth Daily.      Biotin 1000 MCG tablet Take 2,000 mcg by mouth Daily.      calcium carbonate (OS-TYRON) 600 MG tablet Take 1 tablet by mouth Daily.      lidocaine (LIDODERM) 5 % Place 1 patch on the skin as directed by provider Daily. Remove & Discard patch within 12 hours or as directed by MD 6 patch 0    lisinopril-hydrochlorothiazide (PRINZIDE,ZESTORETIC) 10-12.5 MG per tablet TAKE 1 TABLET DAILY 90 tablet 2    Multiple Vitamin (multivitamin) capsule Take 1 capsule by mouth Daily.      Psyllium 28.3 % powder Take  by mouth Daily.      vitamin B-12 (CYANOCOBALAMIN) 100 MCG tablet Take 0.5 tablets by mouth Daily.      vitamin C (ASCORBIC ACID) 500 MG tablet Take 1 tablet by mouth Daily.      vitamin D3 125 MCG (5000 UT) capsule capsule Take 1 capsule by mouth Daily.      [DISCONTINUED] clopidogrel (PLAVIX) 75 MG tablet Take 1 tablet by mouth Daily. LD 8/2      [DISCONTINUED] methocarbamol (ROBAXIN) 750 MG tablet Take 1 tablet by mouth 3 (Three) Times a Day As Needed for Muscle Spasms. 15 tablet 0    [DISCONTINUED] methylPREDNISolone (MEDROL) 4 MG dose pack Take as directed on package instructions. 21 tablet 0     No current facility-administered medications on file prior to visit.       Allergies   Allergen Reactions    Amoxapine Other (See Comments)     Passed out  01/23/2006-ASENDIN seizure  Passed out      Brindall Salmeron-Chromium Melissa Unknown - Low  "Severity     \"burns mouth\" (strawberry)     Cinnamon Unknown - Low Severity     \"Burns mouth\"  \"Burns mouth\"    Coconut (Cocos Nucifera) GI Intolerance    Methocarbamol GI Intolerance    Mobic [Meloxicam] GI Intolerance    Strawberry Extract Other (See Comments)     \"burns mouth\"    Diphenhydramine Hcl Other (See Comments)     Redness,  Pt prefers not to take       Past Medical History:   Diagnosis Date    Aneurysm     Cerebral aneurysm     CLL (chronic lymphocytic leukemia)     /SLL    CLL (chronic lymphocytic leukemia)     H/O Broken shoulder     S/P seizure while on Asendin.    H/O Foot deformities     Various foot deformities corrected from 1979    H/O Ovarian cysts     Hypertension     Injury of back     Lumbosacral disc disease     PONV (postoperative nausea and vomiting)     Scoliosis     Gives back pain       Past Surgical History:   Procedure Laterality Date    CEREBRAL ANEURYSM REPAIR      MCA ANEURYSM EMBOLIZATION WITH STENT ASSISTED COILING    COLONOSCOPY N/A 04/12/2021    Procedure: COLONOSCOPY TO CECUM WITH COLD SNARE POLYPECTOMIES WITH CLIP TO RECTOSIGMOID SITE;  Surgeon: Malick Rosales MD;  Location: [facility] ENDOSCOPY;  Service: Gastroenterology;  Laterality: N/A;  PRE:Positive cologuard  POST:POLYPS    COLONOSCOPY W/ POLYPECTOMY N/A 8/8/2024    Procedure: COLONOSCOPY to cecum WITH cold BIOPSY POLYPECTOMY;  Surgeon: Malick Rosales MD;  Location: [facility] ENDOSCOPY;  Service: Gastroenterology;  Laterality: N/A;  pre: h/o colon polyps  post: polyp    EPIDURAL BLOCK      FOOT SURGERY      1979 to 2000 various foot deformities corrected    NASAL SEPTUM SURGERY      OOPHORECTOMY Left 1992    and part of right    SHOULDER SURGERY Left 1989    Replacement of the left shoulder in 1989 in [place], [place] because of osteoarthitis, trauma, and pain    SHOULDER SURGERY Left 2005    Shoulder revision       Social History     Socioeconomic History    Marital status:      Spouse name: Denilson " "   Number of children: 0    Years of education: College   Tobacco Use    Smoking status: Never    Smokeless tobacco: Never   Vaping Use    Vaping status: Never Used   Substance and Sexual Activity    Alcohol use: Yes     Alcohol/week: 1.0 standard drink of alcohol     Types: 1 Glasses of wine per week     Comment: Occasional    Drug use: No    Sexual activity: Defer       Family History   Problem Relation Age of Onset    Leukemia Mother 85        CLL    Allergy (severe) Mother     Hypertension Mother     Skin cancer Mother 16    Thrombosis Father         Cerebral thrombosis    Bleeding Disorder Sister     Cervical cancer Sister 49    Malig Hyperthermia Neg Hx        The following portions of the patient's history were reviewed and updated as appropriate: allergies, current medications, past family history, past medical history, past social history, past surgical history and problem list.       Objective:       Vitals:    07/14/25 1115   BP: 130/76   BP Location: Left arm   Patient Position: Sitting   Cuff Size: Adult   Pulse: 68   SpO2: 97%   Weight: 46.7 kg (103 lb)   Height: 162.6 cm (64\")         Physical Exam  Vitals and nursing note reviewed.   Constitutional:       Appearance: Normal appearance. She is normal weight.   Eyes:      Extraocular Movements: Extraocular movements intact.      Pupils: Pupils are equal, round, and reactive to light.   Cardiovascular:      Rate and Rhythm: Normal rate and regular rhythm.      Chest Wall: PMI is not displaced. No thrill.      Pulses: Normal pulses.      Heart sounds: Normal heart sounds.   Pulmonary:      Effort: Pulmonary effort is normal.      Breath sounds: Normal breath sounds.   Musculoskeletal:      Cervical back: Normal range of motion.      Right lower leg: No edema.      Left lower leg: No edema.   Skin:     General: Skin is warm and dry.   Neurological:      General: No focal deficit present.      Mental Status: She is alert and oriented to person, place, and " "time. Mental status is at baseline.   Psychiatric:         Mood and Affect: Mood normal.         Behavior: Behavior normal.         Thought Content: Thought content normal.         Judgment: Judgment normal.               Lab Results   Component Value Date     05/01/2020     02/12/2020    K 5.0 (H) 05/01/2020    K 3.8 02/12/2020     05/01/2020     02/12/2020    CO2 28.8 05/01/2020    CO2 29.3 (H) 02/12/2020    BUN 20 05/01/2020    BUN 17 02/12/2020    CREATININE 0.90 01/04/2024    CREATININE 1.00 10/31/2023    EGFRIFNONA 58 (L) 05/01/2020    EGFRIFNONA 78 02/12/2020    GLUCOSE 91 05/01/2020    GLUCOSE 79 02/12/2020    CALCIUM 10.0 05/01/2020    CALCIUM 9.5 02/12/2020    ALBUMIN 4.2 04/09/2025    ALBUMIN 4.2 10/23/2024    BILITOT 0.5 04/09/2025    BILITOT 0.5 10/23/2024    AST 25 04/09/2025    AST 26 10/23/2024    ALT 25 04/09/2025    ALT 22 10/23/2024     Lab Results   Component Value Date    WBC 8.79 03/12/2024    WBC 8.46 03/11/2024    HGB 11.0 (L) 03/12/2024    HGB 11.6 (L) 03/11/2024    HCT 33.0 (L) 03/12/2024    HCT 35.3 (L) 03/11/2024    MCV 96.8 03/12/2024    MCV 97.2 03/11/2024     03/12/2024     03/11/2024     Lab Results   Component Value Date    TRIG 129 06/30/2025    TRIG 55 02/27/2025    HDL 90 06/30/2025    HDL 92 (H) 02/27/2025    LDL 89 06/30/2025    LDL 92 03/18/2024     No results found for: \"PROBNP\", \"BNP\"  No results found for: \"CKTOTAL\", \"CKMB\", \"CKMBINDEX\", \"TROPONINI\", \"TROPONINT\"  No results found for: \"TSH\"        ECG 12 Lead    Date/Time: 7/14/2025 11:42 AM  Performed by: Gerson Boyce APRN    Authorized by: Gerson Boyce APRN  Comparison: compared with previous ECG from 7/11/2024  Rhythm: sinus rhythm  Rate: normal  BPM: 68  Conduction: conduction normal  ST Segments: ST segments normal  T Waves: T waves normal  Other: no other findings             Assessment:         Diagnoses and all orders for this visit:    1. Primary hypertension " (Primary)  -     ECG 12 Lead            Plan:       Abigail Rizzo is a 74 y.o. female with medical history of essential hypertension. They are here today for follow up.  Overall patient seems to be doing very well from a cardiac standpoint.  Her blood pressure is well-controlled at home.  Will continue patient on amlodipine and lisinopril-hydrochlorothiazide.  Recent lab work completed last month demonstrated stable creatinine and mildly elevated BUN.  Electrolytes normal.  Lipid panel demonstrated controlled cholesterol.  Will continue patient on current medication regiment and have her follow back up with Dr. Marie in 1 year.    Essential hypertension:   Blood pressure reasonably well controlled.   Continue lisinopril-HCTZ therapy at current dose.    Follow-up with Dr. Marie in 1 year      MAGGI Ponce  [facility]  07/14/25  12:07 EDT

## 2025-08-13 ENCOUNTER — TELEPHONE (OUTPATIENT)
Dept: PAIN MEDICINE | Facility: CLINIC | Age: 75
End: 2025-08-13
Payer: MEDICARE

## 2025-08-14 ENCOUNTER — OFFICE VISIT (OUTPATIENT)
Dept: PAIN MEDICINE | Facility: CLINIC | Age: 75
End: 2025-08-14
Payer: MEDICARE

## 2025-08-14 ENCOUNTER — TRANSCRIBE ORDERS (OUTPATIENT)
Dept: SURGERY | Facility: SURGERY CENTER | Age: 75
End: 2025-08-14
Payer: MEDICARE

## 2025-08-14 VITALS
DIASTOLIC BLOOD PRESSURE: 72 MMHG | HEIGHT: 64 IN | SYSTOLIC BLOOD PRESSURE: 104 MMHG | HEART RATE: 73 BPM | BODY MASS INDEX: 17.86 KG/M2 | OXYGEN SATURATION: 98 % | TEMPERATURE: 97.3 F | WEIGHT: 104.6 LBS

## 2025-08-14 DIAGNOSIS — Z41.9 SURGERY, ELECTIVE: Primary | ICD-10-CM

## 2025-08-14 DIAGNOSIS — M41.9 SCOLIOSIS, UNSPECIFIED SCOLIOSIS TYPE, UNSPECIFIED SPINAL REGION: ICD-10-CM

## 2025-08-14 DIAGNOSIS — M51.26 DISPLACEMENT OF LUMBAR INTERVERTEBRAL DISC WITHOUT MYELOPATHY: ICD-10-CM

## 2025-08-14 DIAGNOSIS — M54.16 LUMBAR RADICULOPATHY: Primary | ICD-10-CM

## 2025-08-14 DIAGNOSIS — S22.41XS CLOSED FRACTURE OF MULTIPLE RIBS OF RIGHT SIDE, SEQUELA: ICD-10-CM

## 2025-08-15 ENCOUNTER — TRANSCRIBE ORDERS (OUTPATIENT)
Dept: SURGERY | Facility: SURGERY CENTER | Age: 75
End: 2025-08-15
Payer: MEDICARE

## 2025-08-15 ENCOUNTER — PATIENT ROUNDING (BHMG ONLY) (OUTPATIENT)
Dept: PAIN MEDICINE | Facility: CLINIC | Age: 75
End: 2025-08-15
Payer: MEDICARE

## 2025-08-15 DIAGNOSIS — Z41.9 SURGERY, ELECTIVE: Primary | ICD-10-CM

## (undated) DEVICE — CANN O2 ETCO2 FITS ALL CONN CO2 SMPL A/ 7IN DISP LF

## (undated) DEVICE — SENSR O2 OXIMAX FNGR A/ 18IN NONSTR

## (undated) DEVICE — LN SMPL CO2 SHTRM SD STREAM W/M LUER

## (undated) DEVICE — ADAPT CLN BIOGUARD AIR/H2O DISP

## (undated) DEVICE — TUBING, SUCTION, 1/4" X 10', STRAIGHT: Brand: MEDLINE

## (undated) DEVICE — KT ORCA ORCAPOD DISP STRL

## (undated) DEVICE — SNAR POLYP SENSATION STDOVL 27 240 BX40

## (undated) DEVICE — THE TORRENT IRRIGATION SCOPE CONNECTOR IS USED WITH THE TORRENT IRRIGATION TUBING TO PROVIDE IRRIGATION FLUIDS SUCH AS STERILE WATER DURING GASTROINTESTINAL ENDOSCOPIC PROCEDURES WHEN USED IN CONJUNCTION WITH AN IRRIGATION PUMP (OR ELECTROSURGICAL UNIT).: Brand: TORRENT

## (undated) DEVICE — THE SINGLE USE ETRAP – POLYP TRAP IS USED FOR SUCTION RETRIEVAL OF ENDOSCOPICALLY REMOVED POLYPS.: Brand: ETRAP

## (undated) DEVICE — SINGLE-USE BIOPSY FORCEPS: Brand: RADIAL JAW 4